# Patient Record
Sex: FEMALE | Race: WHITE | NOT HISPANIC OR LATINO | Employment: FULL TIME | URBAN - NONMETROPOLITAN AREA
[De-identification: names, ages, dates, MRNs, and addresses within clinical notes are randomized per-mention and may not be internally consistent; named-entity substitution may affect disease eponyms.]

---

## 2023-08-19 DIAGNOSIS — E78.2 MIXED HYPERLIPIDEMIA: ICD-10-CM

## 2023-08-19 DIAGNOSIS — I47.10 SUPRAVENTRICULAR TACHYCARDIA (CMS-HCC): ICD-10-CM

## 2023-08-19 DIAGNOSIS — I10 BENIGN HYPERTENSION: Primary | ICD-10-CM

## 2023-08-19 PROBLEM — R00.8 BIGEMINAL PULSE: Status: ACTIVE | Noted: 2023-08-19

## 2023-08-19 PROBLEM — E11.9 CONTROLLED TYPE 2 DIABETES MELLITUS WITHOUT COMPLICATION, WITHOUT LONG-TERM CURRENT USE OF INSULIN (MULTI): Status: ACTIVE | Noted: 2023-08-19

## 2023-08-19 PROBLEM — K76.0 NONALCOHOLIC FATTY LIVER: Status: ACTIVE | Noted: 2023-08-19

## 2023-08-19 PROBLEM — M77.10 LATERAL EPICONDYLITIS: Status: RESOLVED | Noted: 2023-08-19 | Resolved: 2023-08-19

## 2023-08-19 RX ORDER — METOPROLOL TARTRATE 50 MG/1
50 TABLET ORAL 2 TIMES DAILY
Qty: 180 TABLET | Refills: 0 | Status: SHIPPED | OUTPATIENT
Start: 2023-08-19 | End: 2023-11-10

## 2023-08-19 RX ORDER — METOPROLOL TARTRATE 50 MG/1
TABLET ORAL
COMMUNITY
Start: 2021-07-23 | End: 2023-08-19 | Stop reason: SDUPTHER

## 2023-08-19 RX ORDER — AMLODIPINE BESYLATE 10 MG/1
10 TABLET ORAL DAILY
Qty: 90 TABLET | Refills: 0 | Status: SHIPPED | OUTPATIENT
Start: 2023-08-19 | End: 2023-11-10

## 2023-08-19 RX ORDER — AMLODIPINE BESYLATE 10 MG/1
TABLET ORAL
COMMUNITY
Start: 2021-09-08 | End: 2023-08-19 | Stop reason: SDUPTHER

## 2023-08-19 RX ORDER — ATORVASTATIN CALCIUM 40 MG/1
40 TABLET, FILM COATED ORAL DAILY
Qty: 90 TABLET | Refills: 0 | Status: SHIPPED | OUTPATIENT
Start: 2023-08-19 | End: 2023-11-10

## 2023-08-19 RX ORDER — ATORVASTATIN CALCIUM 40 MG/1
1 TABLET, FILM COATED ORAL DAILY
COMMUNITY
Start: 2021-11-01 | End: 2023-08-19 | Stop reason: SDUPTHER

## 2023-08-19 RX ORDER — VALSARTAN AND HYDROCHLOROTHIAZIDE 160; 12.5 MG/1; MG/1
1 TABLET, FILM COATED ORAL EVERY 24 HOURS
Qty: 90 TABLET | Refills: 0 | Status: SHIPPED | OUTPATIENT
Start: 2023-08-19 | End: 2023-11-10

## 2023-08-19 RX ORDER — VALSARTAN AND HYDROCHLOROTHIAZIDE 160; 12.5 MG/1; MG/1
TABLET, FILM COATED ORAL EVERY 24 HOURS
COMMUNITY
Start: 2012-09-21 | End: 2023-08-19 | Stop reason: SDUPTHER

## 2023-11-10 DIAGNOSIS — E78.2 MIXED HYPERLIPIDEMIA: ICD-10-CM

## 2023-11-10 DIAGNOSIS — I10 BENIGN HYPERTENSION: ICD-10-CM

## 2023-11-10 DIAGNOSIS — I47.10 SUPRAVENTRICULAR TACHYCARDIA (CMS-HCC): ICD-10-CM

## 2023-11-10 RX ORDER — METOPROLOL TARTRATE 50 MG/1
50 TABLET ORAL 2 TIMES DAILY
Qty: 180 TABLET | Refills: 0 | Status: SHIPPED | OUTPATIENT
Start: 2023-11-10 | End: 2024-02-08

## 2023-11-10 RX ORDER — VALSARTAN AND HYDROCHLOROTHIAZIDE 160; 12.5 MG/1; MG/1
1 TABLET, FILM COATED ORAL DAILY
Qty: 90 TABLET | Refills: 0 | Status: SHIPPED | OUTPATIENT
Start: 2023-11-10 | End: 2024-02-08

## 2023-11-10 RX ORDER — AMLODIPINE BESYLATE 10 MG/1
10 TABLET ORAL DAILY
Qty: 90 TABLET | Refills: 0 | Status: SHIPPED | OUTPATIENT
Start: 2023-11-10 | End: 2024-02-08

## 2023-11-10 RX ORDER — ATORVASTATIN CALCIUM 40 MG/1
40 TABLET, FILM COATED ORAL DAILY
Qty: 90 TABLET | Refills: 0 | Status: SHIPPED | OUTPATIENT
Start: 2023-11-10 | End: 2024-02-08

## 2024-02-08 DIAGNOSIS — I47.10 SUPRAVENTRICULAR TACHYCARDIA (CMS-HCC): ICD-10-CM

## 2024-02-08 DIAGNOSIS — I10 BENIGN HYPERTENSION: ICD-10-CM

## 2024-02-08 DIAGNOSIS — E78.2 MIXED HYPERLIPIDEMIA: ICD-10-CM

## 2024-02-08 RX ORDER — VALSARTAN AND HYDROCHLOROTHIAZIDE 160; 12.5 MG/1; MG/1
1 TABLET, FILM COATED ORAL DAILY
Qty: 90 TABLET | Refills: 1 | Status: SHIPPED | OUTPATIENT
Start: 2024-02-08 | End: 2024-08-06

## 2024-02-08 RX ORDER — ATORVASTATIN CALCIUM 40 MG/1
40 TABLET, FILM COATED ORAL DAILY
Qty: 90 TABLET | Refills: 1 | Status: SHIPPED | OUTPATIENT
Start: 2024-02-08 | End: 2024-08-06

## 2024-02-08 RX ORDER — AMLODIPINE BESYLATE 10 MG/1
10 TABLET ORAL DAILY
Qty: 90 TABLET | Refills: 1 | Status: SHIPPED | OUTPATIENT
Start: 2024-02-08 | End: 2024-08-06

## 2024-02-08 RX ORDER — METOPROLOL TARTRATE 50 MG/1
50 TABLET ORAL 2 TIMES DAILY
Qty: 180 TABLET | Refills: 1 | Status: SHIPPED | OUTPATIENT
Start: 2024-02-08 | End: 2024-08-06

## 2024-02-29 ENCOUNTER — OFFICE VISIT (OUTPATIENT)
Dept: PRIMARY CARE | Facility: CLINIC | Age: 64
End: 2024-02-29
Payer: COMMERCIAL

## 2024-02-29 VITALS
HEART RATE: 61 BPM | DIASTOLIC BLOOD PRESSURE: 81 MMHG | WEIGHT: 254 LBS | BODY MASS INDEX: 45 KG/M2 | HEIGHT: 63 IN | OXYGEN SATURATION: 96 % | SYSTOLIC BLOOD PRESSURE: 129 MMHG

## 2024-02-29 DIAGNOSIS — E55.9 AVITAMINOSIS D: ICD-10-CM

## 2024-02-29 DIAGNOSIS — Z12.12 SCREENING FOR COLORECTAL CANCER: ICD-10-CM

## 2024-02-29 DIAGNOSIS — E66.01 CLASS 3 SEVERE OBESITY DUE TO EXCESS CALORIES WITH SERIOUS COMORBIDITY AND BODY MASS INDEX (BMI) OF 40.0 TO 44.9 IN ADULT (MULTI): ICD-10-CM

## 2024-02-29 DIAGNOSIS — I10 BENIGN HYPERTENSION: ICD-10-CM

## 2024-02-29 DIAGNOSIS — E78.2 MIXED HYPERLIPIDEMIA: ICD-10-CM

## 2024-02-29 DIAGNOSIS — E53.8 VITAMIN B12 DEFICIENCY: ICD-10-CM

## 2024-02-29 DIAGNOSIS — Z12.11 SCREENING FOR COLORECTAL CANCER: ICD-10-CM

## 2024-02-29 DIAGNOSIS — Z12.31 VISIT FOR SCREENING MAMMOGRAM: ICD-10-CM

## 2024-02-29 DIAGNOSIS — E11.9 CONTROLLED TYPE 2 DIABETES MELLITUS WITHOUT COMPLICATION, WITHOUT LONG-TERM CURRENT USE OF INSULIN (MULTI): Primary | ICD-10-CM

## 2024-02-29 PROBLEM — E66.813 CLASS 3 SEVERE OBESITY DUE TO EXCESS CALORIES WITH SERIOUS COMORBIDITY AND BODY MASS INDEX (BMI) OF 40.0 TO 44.9 IN ADULT: Status: ACTIVE | Noted: 2024-02-29

## 2024-02-29 LAB
ALBUMIN SERPL BCP-MCNC: 4.2 G/DL (ref 3.4–5)
ALP SERPL-CCNC: 71 U/L (ref 33–136)
ALT SERPL W P-5'-P-CCNC: 43 U/L (ref 7–45)
ANION GAP SERPL CALC-SCNC: 14 MMOL/L (ref 10–20)
AST SERPL W P-5'-P-CCNC: 39 U/L (ref 9–39)
BILIRUB SERPL-MCNC: 0.5 MG/DL (ref 0–1.2)
BUN SERPL-MCNC: 13 MG/DL (ref 6–23)
CALCIUM SERPL-MCNC: 9.2 MG/DL (ref 8.6–10.3)
CHLORIDE SERPL-SCNC: 102 MMOL/L (ref 98–107)
CHOLEST SERPL-MCNC: 173 MG/DL (ref 0–199)
CHOLESTEROL/HDL RATIO: 3.3
CO2 SERPL-SCNC: 28 MMOL/L (ref 21–32)
CREAT SERPL-MCNC: 0.83 MG/DL (ref 0.5–1.05)
EGFRCR SERPLBLD CKD-EPI 2021: 79 ML/MIN/1.73M*2
GLUCOSE SERPL-MCNC: 167 MG/DL (ref 74–99)
HDLC SERPL-MCNC: 52.9 MG/DL
LDLC SERPL CALC-MCNC: 82 MG/DL
NON HDL CHOLESTEROL: 120 MG/DL (ref 0–149)
POC HEMOGLOBIN A1C: 7.3 % (ref 4.2–6.5)
POTASSIUM SERPL-SCNC: 4.8 MMOL/L (ref 3.5–5.3)
PROT SERPL-MCNC: 7 G/DL (ref 6.4–8.2)
SODIUM SERPL-SCNC: 139 MMOL/L (ref 136–145)
TRIGL SERPL-MCNC: 191 MG/DL (ref 0–149)
VLDL: 38 MG/DL (ref 0–40)

## 2024-02-29 PROCEDURE — 83036 HEMOGLOBIN GLYCOSYLATED A1C: CPT | Performed by: FAMILY MEDICINE

## 2024-02-29 PROCEDURE — 4004F PT TOBACCO SCREEN RCVD TLK: CPT | Performed by: FAMILY MEDICINE

## 2024-02-29 PROCEDURE — 36415 COLL VENOUS BLD VENIPUNCTURE: CPT

## 2024-02-29 PROCEDURE — 99214 OFFICE O/P EST MOD 30 MIN: CPT | Performed by: FAMILY MEDICINE

## 2024-02-29 PROCEDURE — 3008F BODY MASS INDEX DOCD: CPT | Performed by: FAMILY MEDICINE

## 2024-02-29 PROCEDURE — 80053 COMPREHEN METABOLIC PANEL: CPT

## 2024-02-29 PROCEDURE — 82306 VITAMIN D 25 HYDROXY: CPT

## 2024-02-29 PROCEDURE — 3048F LDL-C <100 MG/DL: CPT | Performed by: FAMILY MEDICINE

## 2024-02-29 PROCEDURE — 3074F SYST BP LT 130 MM HG: CPT | Performed by: FAMILY MEDICINE

## 2024-02-29 PROCEDURE — 80061 LIPID PANEL: CPT

## 2024-02-29 PROCEDURE — 3079F DIAST BP 80-89 MM HG: CPT | Performed by: FAMILY MEDICINE

## 2024-02-29 RX ORDER — BLOOD SUGAR DIAGNOSTIC
STRIP MISCELLANEOUS
COMMUNITY
Start: 2021-11-08

## 2024-02-29 ASSESSMENT — PATIENT HEALTH QUESTIONNAIRE - PHQ9
SUM OF ALL RESPONSES TO PHQ9 QUESTIONS 1 AND 2: 0
2. FEELING DOWN, DEPRESSED OR HOPELESS: NOT AT ALL
1. LITTLE INTEREST OR PLEASURE IN DOING THINGS: NOT AT ALL

## 2024-02-29 NOTE — PROGRESS NOTES
Subjective   Patient ID: Helga Salmon is a 63 y.o. female who presents for Hypertension, Hyperlipidemia, and Diabetes.  HPI  No SE meds  No CP, SOB, numbness, weakness, dizziness, HA, vision changes  Occasional palpitations when in SVT     Ophtho-   Dentist- dentures  [place]-   Colonoscopy-   LILY-  FOBT-  UA/Micro-  Mammo- ordered  DEXA-  PAP-  Lung CT-  Coronary Calcium CT Score-  AAA-  EKG-  Prevnar- refused  Flu- 10/23  Shingrix-  Td-  Hep C-  Advance Directives-    Current Outpatient Medications:     amLODIPine (Norvasc) 10 mg tablet, Take 1 tablet (10 mg) by mouth once daily., Disp: 90 tablet, Rfl: 1    atorvastatin (Lipitor) 40 mg tablet, Take 1 tablet (40 mg) by mouth once daily., Disp: 90 tablet, Rfl: 1    blood sugar diagnostic (Accu-Chek Yaz Plus test strp) strip, Accu-Chek Yaz Plus In Vitro Strip  Quantity: 100  Refills: 0      Start : 2021  Active, Disp: , Rfl:     metoprolol tartrate (Lopressor) 50 mg tablet, Take 1 tablet by mouth 2 times a day., Disp: 180 tablet, Rfl: 1    valsartan-hydrochlorothiazide (Diovan-HCT) 160-12.5 mg tablet, Take 1 tablet by mouth once daily., Disp: 90 tablet, Rfl: 1   Past Surgical History:   Procedure Laterality Date     SECTION, CLASSIC  2013     Section    OTHER SURGICAL HISTORY  2013    Oral Surgery    OTHER SURGICAL HISTORY  2013    Ovarian Cystectomy    OTHER SURGICAL HISTORY  2013    Laparosc Restrictive Proc Adjustable Gastric Band Placement      Past Medical History:   Diagnosis Date    Abnormal finding on thyroid function test 2012    Lateral epicondylitis 2023    Morbid (severe) obesity due to excess calories (CMS/HCC) 2013    Morbid obesity    Personal history of other diseases of the circulatory system     History of hypertension    Personal history of other diseases of the digestive system     History of esophageal reflux    Personal history of other endocrine, nutritional and  "metabolic disease     History of hyperlipidemia     Social History     Tobacco Use    Smoking status: Every Day     Types: Cigarettes    Smokeless tobacco: Never      No family history on file.   Review of Systems    Objective   /81   Pulse 61   Ht 1.6 m (5' 3\")   Wt 115 kg (254 lb)   SpO2 96%   BMI 44.99 kg/m²    Physical Exam  Vitals and nursing note reviewed.   Constitutional:       General: She is not in acute distress.     Appearance: Normal appearance. She is obese. She is not ill-appearing.   HENT:      Head: Normocephalic and atraumatic.      Right Ear: Tympanic membrane, ear canal and external ear normal.      Left Ear: Tympanic membrane, ear canal and external ear normal.      Nose: Nose normal.      Mouth/Throat:      Mouth: Mucous membranes are moist.      Pharynx: Oropharynx is clear.   Eyes:      Extraocular Movements: Extraocular movements intact.      Conjunctiva/sclera: Conjunctivae normal.      Pupils: Pupils are equal, round, and reactive to light.   Neck:      Vascular: No carotid bruit.   Cardiovascular:      Rate and Rhythm: Normal rate and regular rhythm.      Pulses: Normal pulses.           Dorsalis pedis pulses are 2+ on the right side and 2+ on the left side.        Posterior tibial pulses are 2+ on the right side and 2+ on the left side.      Heart sounds: Normal heart sounds.   Pulmonary:      Effort: Pulmonary effort is normal.      Breath sounds: Normal breath sounds.   Abdominal:      General: Abdomen is flat. Bowel sounds are normal.      Palpations: Abdomen is soft.   Musculoskeletal:      Cervical back: Normal range of motion.      Right foot: No deformity.      Left foot: No deformity.   Feet:      Right foot:      Protective Sensation: 7 sites tested.  7 sites sensed.      Skin integrity: Callus present.      Toenail Condition: Right toenails are normal.      Left foot:      Protective Sensation: 7 sites tested.  7 sites sensed.      Skin integrity: No callus.      " Toenail Condition: Left toenails are normal.   Lymphadenopathy:      Cervical: No cervical adenopathy.   Skin:     Capillary Refill: Capillary refill takes less than 2 seconds.   Neurological:      General: No focal deficit present.      Mental Status: She is alert and oriented to person, place, and time.   Psychiatric:         Mood and Affect: Mood normal.         Behavior: Behavior normal.         Assessment/Plan   Problem List Items Addressed This Visit       Vitamin B12 deficiency    Relevant Orders    Vitamin B12    Mixed hyperlipidemia    Relevant Orders    Comprehensive Metabolic Panel (Completed)    Lipid Panel (Completed)    Controlled type 2 diabetes mellitus without complication, without long-term current use of insulin (CMS/[facility]) - Primary    Relevant Orders    POCT glycosylated hemoglobin (Hb A1C) manually resulted (Completed)    Benign hypertension    Relevant Orders    Comprehensive Metabolic Panel (Completed)    Avitaminosis D    Relevant Orders    Vitamin D 25-Hydroxy,Total (for eval of Vitamin D levels)     Other Visit Diagnoses       Visit for screening mammogram        Relevant Orders    BI mammo bilateral screening    Screening for colorectal cancer        Relevant Orders    Colonoscopy Screening; Average Risk Patient        Renewed/continued rest of medications  Checked labs  Updated Health Maintenance in HPI section  HTN, controlled- continue meds, low sodium diet     Obesity- caloric restriction, increase CV exercise     Hyperlipidemia- continue meds, low fat/cholesterol diet     SVT- metoprolol, avoid caffeine     DM, type 2- avoid sugars, low carbs, increase CV exercise, check feet daily    Low vitamin D/B12- supplement, follow level     F/U 6 months     Patient understands and agrees with treatment plan    Grover Gustafson, DO

## 2024-03-01 DIAGNOSIS — E11.9 CONTROLLED TYPE 2 DIABETES MELLITUS WITHOUT COMPLICATION, WITHOUT LONG-TERM CURRENT USE OF INSULIN (MULTI): ICD-10-CM

## 2024-03-01 DIAGNOSIS — E55.9 AVITAMINOSIS D: Primary | ICD-10-CM

## 2024-03-01 LAB — 25(OH)D3 SERPL-MCNC: 18 NG/ML (ref 30–100)

## 2024-03-01 RX ORDER — METFORMIN HYDROCHLORIDE 500 MG/1
1000 TABLET, EXTENDED RELEASE ORAL
Qty: 180 TABLET | Refills: 3 | Status: SHIPPED | OUTPATIENT
Start: 2024-03-01 | End: 2025-03-01

## 2024-03-01 RX ORDER — CHOLECALCIFEROL (VITAMIN D3) 1250 MCG
50000 TABLET ORAL
Qty: 12 TABLET | Refills: 3 | Status: SHIPPED | OUTPATIENT
Start: 2024-03-01 | End: 2025-03-01

## 2024-08-01 DIAGNOSIS — I10 BENIGN HYPERTENSION: ICD-10-CM

## 2024-08-01 DIAGNOSIS — I47.10 SUPRAVENTRICULAR TACHYCARDIA (CMS-HCC): ICD-10-CM

## 2024-08-01 DIAGNOSIS — E78.2 MIXED HYPERLIPIDEMIA: ICD-10-CM

## 2024-08-01 RX ORDER — AMLODIPINE BESYLATE 10 MG/1
10 TABLET ORAL DAILY
Qty: 90 TABLET | Refills: 3 | Status: SHIPPED | OUTPATIENT
Start: 2024-08-01

## 2024-08-01 RX ORDER — ATORVASTATIN CALCIUM 40 MG/1
40 TABLET, FILM COATED ORAL DAILY
Qty: 90 TABLET | Refills: 3 | Status: SHIPPED | OUTPATIENT
Start: 2024-08-01

## 2024-08-01 RX ORDER — METOPROLOL TARTRATE 50 MG/1
50 TABLET ORAL 2 TIMES DAILY
Qty: 180 TABLET | Refills: 3 | Status: SHIPPED | OUTPATIENT
Start: 2024-08-01

## 2024-08-01 RX ORDER — VALSARTAN AND HYDROCHLOROTHIAZIDE 160; 12.5 MG/1; MG/1
1 TABLET, FILM COATED ORAL DAILY
Qty: 90 TABLET | Refills: 3 | Status: SHIPPED | OUTPATIENT
Start: 2024-08-01

## 2024-09-13 ENCOUNTER — OFFICE VISIT (OUTPATIENT)
Dept: PRIMARY CARE | Facility: CLINIC | Age: 64
End: 2024-09-13
Payer: COMMERCIAL

## 2024-09-13 ENCOUNTER — HOSPITAL ENCOUNTER (OUTPATIENT)
Dept: RADIOLOGY | Facility: HOSPITAL | Age: 64
Discharge: HOME | End: 2024-09-13
Payer: COMMERCIAL

## 2024-09-13 VITALS — OXYGEN SATURATION: 93 % | SYSTOLIC BLOOD PRESSURE: 160 MMHG | HEART RATE: 77 BPM | DIASTOLIC BLOOD PRESSURE: 78 MMHG

## 2024-09-13 DIAGNOSIS — R10.9 FLANK PAIN: Primary | ICD-10-CM

## 2024-09-13 DIAGNOSIS — N12 PYELONEPHRITIS: Primary | ICD-10-CM

## 2024-09-13 DIAGNOSIS — R10.9 FLANK PAIN: ICD-10-CM

## 2024-09-13 LAB
POC APPEARANCE, URINE: CLEAR
POC BILIRUBIN, URINE: ABNORMAL
POC BLOOD, URINE: NEGATIVE
POC COLOR, URINE: YELLOW
POC GLUCOSE, URINE: NEGATIVE MG/DL
POC KETONES, URINE: NEGATIVE MG/DL
POC LEUKOCYTES, URINE: NEGATIVE
POC NITRITE,URINE: NEGATIVE
POC PH, URINE: 5.5 PH
POC PROTEIN, URINE: ABNORMAL MG/DL
POC SPECIFIC GRAVITY, URINE: >=1.03
POC UROBILINOGEN, URINE: 0.2 EU/DL

## 2024-09-13 PROCEDURE — 99213 OFFICE O/P EST LOW 20 MIN: CPT

## 2024-09-13 PROCEDURE — 4004F PT TOBACCO SCREEN RCVD TLK: CPT

## 2024-09-13 PROCEDURE — 3048F LDL-C <100 MG/DL: CPT

## 2024-09-13 PROCEDURE — 3077F SYST BP >= 140 MM HG: CPT

## 2024-09-13 PROCEDURE — 74176 CT ABD & PELVIS W/O CONTRAST: CPT

## 2024-09-13 PROCEDURE — 3078F DIAST BP <80 MM HG: CPT

## 2024-09-13 PROCEDURE — 81003 URINALYSIS AUTO W/O SCOPE: CPT

## 2024-09-13 RX ORDER — CIPROFLOXACIN 500 MG/1
500 TABLET ORAL 2 TIMES DAILY
Qty: 14 TABLET | Refills: 0 | Status: SHIPPED | OUTPATIENT
Start: 2024-09-13 | End: 2024-09-20

## 2024-09-13 RX ORDER — CIPROFLOXACIN 500 MG/1
500 TABLET ORAL EVERY 12 HOURS SCHEDULED
Status: DISCONTINUED | OUTPATIENT
Start: 2024-09-13 | End: 2024-09-14 | Stop reason: HOSPADM

## 2024-09-13 NOTE — PROGRESS NOTES
Subjective   Patient ID: Helga Salmon is a 64 y.o. female who presents for Abdominal Pain (Left side flank pain since 3:30 am, dry heaves).  HPI  - Woke up at 330 AM with L flank pain, nausea, felt like she had to go have a BM  - BM did not help  - Pain is achy all the time and occasionally sharp pain radiates around the front and back  - Nausea, dry heaving  - No fevers, chills, body aches  - often feels like she has to pee but nothing really comes out, but this has been a few months.   - Some urinary urgency, no frequency or burning. No hematuria.   - No recent injury or illness  - has tried tylenol at home for pain which does take edge off    Current Outpatient Medications:     amLODIPine (Norvasc) 10 mg tablet, TAKE 1 TABLET DAILY, Disp: 90 tablet, Rfl: 3    atorvastatin (Lipitor) 40 mg tablet, TAKE 1 TABLET DAILY, Disp: 90 tablet, Rfl: 3    blood sugar diagnostic (Accu-Chek Yaz Plus test strp) strip, Accu-Chek Yaz Plus In Vitro Strip  Quantity: 100  Refills: 0      Start : 2021  Active, Disp: , Rfl:     cholecalciferol (Vitamin D3) 1,250 mcg (50,000 unit) tablet, Take 1 tablet (50,000 Units) by mouth every 7 days., Disp: 12 tablet, Rfl: 3    metFORMIN XR (Glucophage-XR) 500 mg 24 hr tablet, Take 2 tablets (1,000 mg) by mouth once daily with breakfast. Do not crush, chew, or split., Disp: 180 tablet, Rfl: 3    metoprolol tartrate (Lopressor) 50 mg tablet, TAKE 1 TABLET TWICE A DAY, Disp: 180 tablet, Rfl: 3    valsartan-hydrochlorothiazide (Diovan-HCT) 160-12.5 mg tablet, TAKE 1 TABLET DAILY, Disp: 90 tablet, Rfl: 3   Past Surgical History:   Procedure Laterality Date     SECTION, CLASSIC  2013     Section    OTHER SURGICAL HISTORY  2013    Oral Surgery    OTHER SURGICAL HISTORY  2013    Ovarian Cystectomy    OTHER SURGICAL HISTORY  2013    Laparosc Restrictive Proc Adjustable Gastric Band Placement      Past Medical History:   Diagnosis Date    Abnormal finding  on thyroid function test 09/11/2012    Lateral epicondylitis 08/19/2023    Morbid (severe) obesity due to excess calories (Multi) 07/31/2013    Morbid obesity    Personal history of other diseases of the circulatory system     History of hypertension    Personal history of other diseases of the digestive system     History of esophageal reflux    Personal history of other endocrine, nutritional and metabolic disease     History of hyperlipidemia     Social History     Tobacco Use    Smoking status: Former     Types: Cigarettes    Smokeless tobacco: Never      No family history on file.   Review of Systems  10 point ROS negative except as otherwise noted in the HPI.      Objective   /78 (BP Location: Left arm, Patient Position: Sitting, BP Cuff Size: Large adult)   Pulse 77   SpO2 93%    Physical Exam  Vitals and nursing note reviewed.   Constitutional:       Appearance: Normal appearance.   Cardiovascular:      Rate and Rhythm: Normal rate and regular rhythm.      Pulses: Normal pulses.      Heart sounds: Normal heart sounds.   Pulmonary:      Effort: Pulmonary effort is normal.      Breath sounds: Normal breath sounds.   Abdominal:      General: Abdomen is flat. Bowel sounds are normal. There is no distension.      Palpations: Abdomen is soft.      Tenderness: There is no abdominal tenderness. There is left CVA tenderness. There is no right CVA tenderness, guarding or rebound.   Skin:     General: Skin is warm and dry.   Neurological:      General: No focal deficit present.      Mental Status: She is alert and oriented to person, place, and time.   Psychiatric:         Mood and Affect: Mood normal.         Behavior: Behavior normal.           Assessment/Plan   Problem List Items Addressed This Visit    None  Visit Diagnoses       Flank pain    -  Primary  - L sided flank pain since 330 AM. Dry heaving and nausea. No fevers/chills. No injury to area. + CVA tenderness on the L, otherwise exam intact   - UA  neg   - CT AP w/o contrast ordered STAT to assess for kidney stone .. Will f/u on results     Relevant Orders    POCT UA Automated manually resulted (Completed)    CT abdomen pelvis wo IV contrast            Discussed at visit any disease processes that were of concern as well as the risks, benefits and instructions on any new medication provided. Patient (and/or caretaker of patient if present) stated all questions were answered, and they voiced understanding of instructions.     Elvi Davis PA-C

## 2024-09-13 NOTE — PROGRESS NOTES
CT A/P returned suspicious for L pyelonephritis which correlates with clinical presentation.   Pt notified and cipro 500 mg BID for 7 days sent.

## 2025-02-13 DIAGNOSIS — E11.9 CONTROLLED TYPE 2 DIABETES MELLITUS WITHOUT COMPLICATION, WITHOUT LONG-TERM CURRENT USE OF INSULIN (MULTI): ICD-10-CM

## 2025-02-13 DIAGNOSIS — E55.9 AVITAMINOSIS D: ICD-10-CM

## 2025-02-18 RX ORDER — METFORMIN HYDROCHLORIDE 500 MG/1
TABLET, EXTENDED RELEASE ORAL
Qty: 180 TABLET | Refills: 0 | Status: SHIPPED | OUTPATIENT
Start: 2025-02-18

## 2025-02-18 RX ORDER — ASPIRIN 325 MG
50000 TABLET, DELAYED RELEASE (ENTERIC COATED) ORAL
Qty: 12 CAPSULE | Refills: 3 | Status: SHIPPED | OUTPATIENT
Start: 2025-02-18

## 2025-03-12 ENCOUNTER — APPOINTMENT (OUTPATIENT)
Dept: PRIMARY CARE | Facility: CLINIC | Age: 65
End: 2025-03-12
Payer: COMMERCIAL

## 2025-05-01 DIAGNOSIS — E11.9 CONTROLLED TYPE 2 DIABETES MELLITUS WITHOUT COMPLICATION, WITHOUT LONG-TERM CURRENT USE OF INSULIN: ICD-10-CM

## 2025-05-02 RX ORDER — METFORMIN HYDROCHLORIDE 500 MG/1
TABLET, EXTENDED RELEASE ORAL
Qty: 180 TABLET | Refills: 3 | Status: SHIPPED | OUTPATIENT
Start: 2025-05-02

## 2025-07-07 ENCOUNTER — CLINICAL SUPPORT (OUTPATIENT)
Dept: PRIMARY CARE | Facility: CLINIC | Age: 65
End: 2025-07-07
Payer: COMMERCIAL

## 2025-07-07 DIAGNOSIS — N12 PYELONEPHRITIS: ICD-10-CM

## 2025-07-07 DIAGNOSIS — R30.9 PAIN WITH URINATION: Primary | ICD-10-CM

## 2025-07-07 LAB
POC APPEARANCE, URINE: ABNORMAL
POC BILIRUBIN, URINE: NEGATIVE
POC BLOOD, URINE: ABNORMAL
POC COLOR, URINE: YELLOW
POC GLUCOSE, URINE: NEGATIVE MG/DL
POC KETONES, URINE: NEGATIVE MG/DL
POC LEUKOCYTES, URINE: ABNORMAL
POC NITRITE,URINE: POSITIVE
POC PH, URINE: 5.5 PH
POC PROTEIN, URINE: ABNORMAL MG/DL
POC SPECIFIC GRAVITY, URINE: 1.02
POC UROBILINOGEN, URINE: 0.2 EU/DL

## 2025-07-07 PROCEDURE — 81003 URINALYSIS AUTO W/O SCOPE: CPT | Performed by: FAMILY MEDICINE

## 2025-07-07 RX ORDER — CIPROFLOXACIN 500 MG/1
500 TABLET, FILM COATED ORAL 2 TIMES DAILY
Qty: 14 TABLET | Refills: 0 | Status: SHIPPED | OUTPATIENT
Start: 2025-07-07 | End: 2025-07-08 | Stop reason: HOSPADM

## 2025-07-08 ENCOUNTER — ANESTHESIA EVENT (OUTPATIENT)
Dept: OPERATING ROOM | Facility: HOSPITAL | Age: 65
End: 2025-07-08

## 2025-07-08 ENCOUNTER — APPOINTMENT (OUTPATIENT)
Dept: RADIOLOGY | Facility: HOSPITAL | Age: 65
End: 2025-07-08
Payer: COMMERCIAL

## 2025-07-08 ENCOUNTER — ANESTHESIA (OUTPATIENT)
Dept: OPERATING ROOM | Facility: HOSPITAL | Age: 65
End: 2025-07-08

## 2025-07-08 ENCOUNTER — HOSPITAL ENCOUNTER (OUTPATIENT)
Facility: HOSPITAL | Age: 65
Setting detail: OBSERVATION
Discharge: HOME | End: 2025-07-08
Attending: STUDENT IN AN ORGANIZED HEALTH CARE EDUCATION/TRAINING PROGRAM | Admitting: INTERNAL MEDICINE
Payer: COMMERCIAL

## 2025-07-08 ENCOUNTER — APPOINTMENT (OUTPATIENT)
Dept: CARDIOLOGY | Facility: HOSPITAL | Age: 65
End: 2025-07-08
Payer: COMMERCIAL

## 2025-07-08 VITALS
OXYGEN SATURATION: 97 % | RESPIRATION RATE: 18 BRPM | BODY MASS INDEX: 44.3 KG/M2 | SYSTOLIC BLOOD PRESSURE: 94 MMHG | HEIGHT: 63 IN | HEART RATE: 71 BPM | TEMPERATURE: 97 F | DIASTOLIC BLOOD PRESSURE: 51 MMHG | WEIGHT: 250 LBS

## 2025-07-08 DIAGNOSIS — N20.1 RIGHT URETERAL STONE: ICD-10-CM

## 2025-07-08 DIAGNOSIS — N12 PYELONEPHRITIS: ICD-10-CM

## 2025-07-08 DIAGNOSIS — N20.0 NEPHROLITHIASIS: Primary | ICD-10-CM

## 2025-07-08 LAB
ABO GROUP (TYPE) IN BLOOD: NORMAL
ALBUMIN SERPL BCP-MCNC: 4.4 G/DL (ref 3.4–5)
ALP SERPL-CCNC: 67 U/L (ref 33–136)
ALT SERPL W P-5'-P-CCNC: 33 U/L (ref 7–45)
ANION GAP SERPL CALC-SCNC: 15 MMOL/L (ref 10–20)
ANTIBODY SCREEN: NORMAL
APPEARANCE UR: ABNORMAL
AST SERPL W P-5'-P-CCNC: 28 U/L (ref 9–39)
BACTERIA #/AREA URNS AUTO: ABNORMAL /HPF
BASOPHILS # BLD AUTO: 0.01 X10*3/UL (ref 0–0.1)
BASOPHILS NFR BLD AUTO: 0.2 %
BILIRUB SERPL-MCNC: 0.6 MG/DL (ref 0–1.2)
BILIRUB UR STRIP.AUTO-MCNC: NEGATIVE MG/DL
BUN SERPL-MCNC: 24 MG/DL (ref 6–23)
CALCIUM SERPL-MCNC: 9.7 MG/DL (ref 8.6–10.3)
CHLORIDE SERPL-SCNC: 102 MMOL/L (ref 98–107)
CO2 SERPL-SCNC: 25 MMOL/L (ref 21–32)
COLOR UR: ABNORMAL
CREAT SERPL-MCNC: 1.13 MG/DL (ref 0.5–1.05)
EGFRCR SERPLBLD CKD-EPI 2021: 54 ML/MIN/1.73M*2
EOSINOPHIL # BLD AUTO: 0.1 X10*3/UL (ref 0–0.7)
EOSINOPHIL NFR BLD AUTO: 1.5 %
ERYTHROCYTE [DISTWIDTH] IN BLOOD BY AUTOMATED COUNT: 16.6 % (ref 11.5–14.5)
GLUCOSE BLD MANUAL STRIP-MCNC: 196 MG/DL (ref 74–99)
GLUCOSE BLD MANUAL STRIP-MCNC: 252 MG/DL (ref 74–99)
GLUCOSE SERPL-MCNC: 252 MG/DL (ref 74–99)
GLUCOSE UR STRIP.AUTO-MCNC: NORMAL MG/DL
HCT VFR BLD AUTO: 40.4 % (ref 36–46)
HGB BLD-MCNC: 12.9 G/DL (ref 12–16)
HOLD SPECIMEN: NORMAL
IMM GRANULOCYTES # BLD AUTO: 0.03 X10*3/UL (ref 0–0.7)
IMM GRANULOCYTES NFR BLD AUTO: 0.5 % (ref 0–0.9)
INR PPP: 1.2 (ref 0.9–1.1)
KETONES UR STRIP.AUTO-MCNC: NEGATIVE MG/DL
LEUKOCYTE ESTERASE UR QL STRIP.AUTO: ABNORMAL
LYMPHOCYTES # BLD AUTO: 0.75 X10*3/UL (ref 1.2–4.8)
LYMPHOCYTES NFR BLD AUTO: 11.4 %
MCH RBC QN AUTO: 29.7 PG (ref 26–34)
MCHC RBC AUTO-ENTMCNC: 31.9 G/DL (ref 32–36)
MCV RBC AUTO: 93 FL (ref 80–100)
MONOCYTES # BLD AUTO: 0.41 X10*3/UL (ref 0.1–1)
MONOCYTES NFR BLD AUTO: 6.2 %
MUCOUS THREADS #/AREA URNS AUTO: ABNORMAL /LPF
NEUTROPHILS # BLD AUTO: 5.3 X10*3/UL (ref 1.2–7.7)
NEUTROPHILS NFR BLD AUTO: 80.2 %
NITRITE UR QL STRIP.AUTO: NEGATIVE
NRBC BLD-RTO: 0.3 /100 WBCS (ref 0–0)
PH UR STRIP.AUTO: 5.5 [PH]
PLATELET # BLD AUTO: 468 X10*3/UL (ref 150–450)
POTASSIUM SERPL-SCNC: 3.9 MMOL/L (ref 3.5–5.3)
PROT SERPL-MCNC: 7.2 G/DL (ref 6.4–8.2)
PROT UR STRIP.AUTO-MCNC: ABNORMAL MG/DL
PROTHROMBIN TIME: 12.8 SECONDS (ref 9.8–12.4)
RBC # BLD AUTO: 4.34 X10*6/UL (ref 4–5.2)
RBC # UR STRIP.AUTO: ABNORMAL MG/DL
RBC #/AREA URNS AUTO: >20 /HPF
RH FACTOR (ANTIGEN D): NORMAL
SODIUM SERPL-SCNC: 138 MMOL/L (ref 136–145)
SP GR UR STRIP.AUTO: 1.02
SQUAMOUS #/AREA URNS AUTO: ABNORMAL /HPF
UROBILINOGEN UR STRIP.AUTO-MCNC: NORMAL MG/DL
WBC # BLD AUTO: 6.6 X10*3/UL (ref 4.4–11.3)
WBC #/AREA URNS AUTO: >50 /HPF
WBC CLUMPS #/AREA URNS AUTO: ABNORMAL /HPF

## 2025-07-08 PROCEDURE — 2550000001 HC RX 255 CONTRASTS: Performed by: STUDENT IN AN ORGANIZED HEALTH CARE EDUCATION/TRAINING PROGRAM

## 2025-07-08 PROCEDURE — 96376 TX/PRO/DX INJ SAME DRUG ADON: CPT | Mod: 59

## 2025-07-08 PROCEDURE — 82947 ASSAY GLUCOSE BLOOD QUANT: CPT | Mod: 59

## 2025-07-08 PROCEDURE — 2500000005 HC RX 250 GENERAL PHARMACY W/O HCPCS: Performed by: INTERNAL MEDICINE

## 2025-07-08 PROCEDURE — 2500000004 HC RX 250 GENERAL PHARMACY W/ HCPCS (ALT 636 FOR OP/ED): Performed by: STUDENT IN AN ORGANIZED HEALTH CARE EDUCATION/TRAINING PROGRAM

## 2025-07-08 PROCEDURE — 96365 THER/PROPH/DIAG IV INF INIT: CPT | Mod: 59

## 2025-07-08 PROCEDURE — 99239 HOSP IP/OBS DSCHRG MGMT >30: CPT | Performed by: INTERNAL MEDICINE

## 2025-07-08 PROCEDURE — 81001 URINALYSIS AUTO W/SCOPE: CPT | Performed by: STUDENT IN AN ORGANIZED HEALTH CARE EDUCATION/TRAINING PROGRAM

## 2025-07-08 PROCEDURE — 87186 SC STD MICRODIL/AGAR DIL: CPT | Mod: GEALAB | Performed by: STUDENT IN AN ORGANIZED HEALTH CARE EDUCATION/TRAINING PROGRAM

## 2025-07-08 PROCEDURE — 2500000004 HC RX 250 GENERAL PHARMACY W/ HCPCS (ALT 636 FOR OP/ED): Performed by: NURSE PRACTITIONER

## 2025-07-08 PROCEDURE — 2500000002 HC RX 250 W HCPCS SELF ADMINISTERED DRUGS (ALT 637 FOR MEDICARE OP, ALT 636 FOR OP/ED): Performed by: NURSE PRACTITIONER

## 2025-07-08 PROCEDURE — 85610 PROTHROMBIN TIME: CPT | Performed by: NURSE PRACTITIONER

## 2025-07-08 PROCEDURE — 99285 EMERGENCY DEPT VISIT HI MDM: CPT | Mod: 25 | Performed by: STUDENT IN AN ORGANIZED HEALTH CARE EDUCATION/TRAINING PROGRAM

## 2025-07-08 PROCEDURE — 86900 BLOOD TYPING SEROLOGIC ABO: CPT | Performed by: NURSE PRACTITIONER

## 2025-07-08 PROCEDURE — 74177 CT ABD & PELVIS W/CONTRAST: CPT

## 2025-07-08 PROCEDURE — 2500000002 HC RX 250 W HCPCS SELF ADMINISTERED DRUGS (ALT 637 FOR MEDICARE OP, ALT 636 FOR OP/ED): Performed by: STUDENT IN AN ORGANIZED HEALTH CARE EDUCATION/TRAINING PROGRAM

## 2025-07-08 PROCEDURE — 99223 1ST HOSP IP/OBS HIGH 75: CPT | Performed by: UROLOGY

## 2025-07-08 PROCEDURE — G0378 HOSPITAL OBSERVATION PER HR: HCPCS

## 2025-07-08 PROCEDURE — 36415 COLL VENOUS BLD VENIPUNCTURE: CPT | Performed by: STUDENT IN AN ORGANIZED HEALTH CARE EDUCATION/TRAINING PROGRAM

## 2025-07-08 PROCEDURE — 96366 THER/PROPH/DIAG IV INF ADDON: CPT | Mod: 59

## 2025-07-08 PROCEDURE — 96372 THER/PROPH/DIAG INJ SC/IM: CPT | Performed by: NURSE PRACTITIONER

## 2025-07-08 PROCEDURE — 2500000004 HC RX 250 GENERAL PHARMACY W/ HCPCS (ALT 636 FOR OP/ED): Mod: JZ

## 2025-07-08 PROCEDURE — 96375 TX/PRO/DX INJ NEW DRUG ADDON: CPT | Mod: 59

## 2025-07-08 PROCEDURE — 80053 COMPREHEN METABOLIC PANEL: CPT | Performed by: STUDENT IN AN ORGANIZED HEALTH CARE EDUCATION/TRAINING PROGRAM

## 2025-07-08 PROCEDURE — 74177 CT ABD & PELVIS W/CONTRAST: CPT | Performed by: RADIOLOGY

## 2025-07-08 PROCEDURE — 2500000001 HC RX 250 WO HCPCS SELF ADMINISTERED DRUGS (ALT 637 FOR MEDICARE OP): Performed by: PHYSICIAN ASSISTANT

## 2025-07-08 PROCEDURE — 82947 ASSAY GLUCOSE BLOOD QUANT: CPT

## 2025-07-08 PROCEDURE — 85025 COMPLETE CBC W/AUTO DIFF WBC: CPT | Performed by: STUDENT IN AN ORGANIZED HEALTH CARE EDUCATION/TRAINING PROGRAM

## 2025-07-08 PROCEDURE — 87086 URINE CULTURE/COLONY COUNT: CPT | Mod: GEALAB | Performed by: STUDENT IN AN ORGANIZED HEALTH CARE EDUCATION/TRAINING PROGRAM

## 2025-07-08 PROCEDURE — 99223 1ST HOSP IP/OBS HIGH 75: CPT | Performed by: PHYSICIAN ASSISTANT

## 2025-07-08 PROCEDURE — 2500000001 HC RX 250 WO HCPCS SELF ADMINISTERED DRUGS (ALT 637 FOR MEDICARE OP): Performed by: NURSE PRACTITIONER

## 2025-07-08 PROCEDURE — 36415 COLL VENOUS BLD VENIPUNCTURE: CPT | Performed by: NURSE PRACTITIONER

## 2025-07-08 PROCEDURE — 93005 ELECTROCARDIOGRAM TRACING: CPT

## 2025-07-08 PROCEDURE — 96361 HYDRATE IV INFUSION ADD-ON: CPT | Mod: 59

## 2025-07-08 RX ORDER — TAMSULOSIN HYDROCHLORIDE 0.4 MG/1
0.4 CAPSULE ORAL DAILY
Status: DISCONTINUED | OUTPATIENT
Start: 2025-07-08 | End: 2025-07-08 | Stop reason: HOSPADM

## 2025-07-08 RX ORDER — SODIUM CHLORIDE 9 MG/ML
100 INJECTION, SOLUTION INTRAVENOUS CONTINUOUS
Status: DISCONTINUED | OUTPATIENT
Start: 2025-07-08 | End: 2025-07-08 | Stop reason: HOSPADM

## 2025-07-08 RX ORDER — METOPROLOL TARTRATE 50 MG/1
50 TABLET ORAL 2 TIMES DAILY
Status: DISCONTINUED | OUTPATIENT
Start: 2025-07-08 | End: 2025-07-08 | Stop reason: HOSPADM

## 2025-07-08 RX ORDER — KETOROLAC TROMETHAMINE 15 MG/ML
15 INJECTION, SOLUTION INTRAMUSCULAR; INTRAVENOUS ONCE
Status: COMPLETED | OUTPATIENT
Start: 2025-07-08 | End: 2025-07-08

## 2025-07-08 RX ORDER — OXYCODONE HYDROCHLORIDE 5 MG/1
5 TABLET ORAL EVERY 6 HOURS PRN
Qty: 15 TABLET | Refills: 0 | Status: SHIPPED | OUTPATIENT
Start: 2025-07-08 | End: 2025-07-13

## 2025-07-08 RX ORDER — ONDANSETRON HYDROCHLORIDE 2 MG/ML
4 INJECTION, SOLUTION INTRAVENOUS ONCE
Status: COMPLETED | OUTPATIENT
Start: 2025-07-08 | End: 2025-07-08

## 2025-07-08 RX ORDER — ACETAMINOPHEN 325 MG/1
975 TABLET ORAL EVERY 8 HOURS
Status: DISCONTINUED | OUTPATIENT
Start: 2025-07-08 | End: 2025-07-08 | Stop reason: HOSPADM

## 2025-07-08 RX ORDER — TAMSULOSIN HYDROCHLORIDE 0.4 MG/1
0.4 CAPSULE ORAL ONCE
Status: COMPLETED | OUTPATIENT
Start: 2025-07-08 | End: 2025-07-08

## 2025-07-08 RX ORDER — CEFTRIAXONE 1 G/50ML
1 INJECTION, SOLUTION INTRAVENOUS EVERY 24 HOURS
Status: DISCONTINUED | OUTPATIENT
Start: 2025-07-08 | End: 2025-07-08 | Stop reason: HOSPADM

## 2025-07-08 RX ORDER — INSULIN LISPRO 100 [IU]/ML
0-10 INJECTION, SOLUTION INTRAVENOUS; SUBCUTANEOUS EVERY 6 HOURS
Status: DISCONTINUED | OUTPATIENT
Start: 2025-07-08 | End: 2025-07-08 | Stop reason: HOSPADM

## 2025-07-08 RX ORDER — TAMSULOSIN HYDROCHLORIDE 0.4 MG/1
0.4 CAPSULE ORAL DAILY
Qty: 7 CAPSULE | Refills: 0 | Status: SHIPPED | OUTPATIENT
Start: 2025-07-08 | End: 2025-07-15

## 2025-07-08 RX ORDER — ENOXAPARIN SODIUM 100 MG/ML
40 INJECTION SUBCUTANEOUS EVERY 12 HOURS SCHEDULED
Status: DISCONTINUED | OUTPATIENT
Start: 2025-07-08 | End: 2025-07-08 | Stop reason: HOSPADM

## 2025-07-08 RX ORDER — ATORVASTATIN CALCIUM 40 MG/1
40 TABLET, FILM COATED ORAL DAILY
Status: CANCELLED | OUTPATIENT
Start: 2025-07-08

## 2025-07-08 RX ORDER — OXYCODONE HYDROCHLORIDE 5 MG/1
5 TABLET ORAL EVERY 4 HOURS PRN
Status: DISCONTINUED | OUTPATIENT
Start: 2025-07-08 | End: 2025-07-08 | Stop reason: HOSPADM

## 2025-07-08 RX ORDER — HYDROMORPHONE HYDROCHLORIDE 1 MG/ML
INJECTION, SOLUTION INTRAMUSCULAR; INTRAVENOUS; SUBCUTANEOUS
Status: COMPLETED
Start: 2025-07-08 | End: 2025-07-08

## 2025-07-08 RX ORDER — DEXTROSE 50 % IN WATER (D50W) INTRAVENOUS SYRINGE
25
Status: DISCONTINUED | OUTPATIENT
Start: 2025-07-08 | End: 2025-07-08 | Stop reason: HOSPADM

## 2025-07-08 RX ORDER — HYDROMORPHONE HYDROCHLORIDE 1 MG/ML
1 INJECTION, SOLUTION INTRAMUSCULAR; INTRAVENOUS; SUBCUTANEOUS ONCE
Status: COMPLETED | OUTPATIENT
Start: 2025-07-08 | End: 2025-07-08

## 2025-07-08 RX ORDER — NALOXONE HYDROCHLORIDE 0.4 MG/ML
0.2 INJECTION, SOLUTION INTRAMUSCULAR; INTRAVENOUS; SUBCUTANEOUS EVERY 5 MIN PRN
Status: DISCONTINUED | OUTPATIENT
Start: 2025-07-08 | End: 2025-07-08 | Stop reason: HOSPADM

## 2025-07-08 RX ORDER — AMLODIPINE BESYLATE 10 MG/1
10 TABLET ORAL DAILY
Status: DISCONTINUED | OUTPATIENT
Start: 2025-07-08 | End: 2025-07-08 | Stop reason: HOSPADM

## 2025-07-08 RX ORDER — CEFTRIAXONE 1 G/50ML
1 INJECTION, SOLUTION INTRAVENOUS ONCE
Status: COMPLETED | OUTPATIENT
Start: 2025-07-08 | End: 2025-07-08

## 2025-07-08 RX ORDER — INSULIN LISPRO 100 [IU]/ML
0-10 INJECTION, SOLUTION INTRAVENOUS; SUBCUTANEOUS
Status: DISCONTINUED | OUTPATIENT
Start: 2025-07-08 | End: 2025-07-08

## 2025-07-08 RX ORDER — DEXTROSE 50 % IN WATER (D50W) INTRAVENOUS SYRINGE
12.5
Status: DISCONTINUED | OUTPATIENT
Start: 2025-07-08 | End: 2025-07-08 | Stop reason: HOSPADM

## 2025-07-08 RX ORDER — CIPROFLOXACIN 500 MG/1
500 TABLET, FILM COATED ORAL 2 TIMES DAILY
Qty: 14 TABLET | Refills: 0 | Status: SHIPPED | OUTPATIENT
Start: 2025-07-08 | End: 2025-07-15

## 2025-07-08 RX ORDER — DOCUSATE SODIUM 100 MG/1
100 CAPSULE, LIQUID FILLED ORAL 2 TIMES DAILY
Status: DISCONTINUED | OUTPATIENT
Start: 2025-07-08 | End: 2025-07-08 | Stop reason: HOSPADM

## 2025-07-08 RX ADMIN — IOHEXOL 75 ML: 350 INJECTION, SOLUTION INTRAVENOUS at 03:22

## 2025-07-08 RX ADMIN — SODIUM CHLORIDE 100 ML/HR: 0.9 INJECTION, SOLUTION INTRAVENOUS at 07:49

## 2025-07-08 RX ADMIN — SODIUM CHLORIDE, SODIUM LACTATE, POTASSIUM CHLORIDE, AND CALCIUM CHLORIDE 1000 ML: .6; .31; .03; .02 INJECTION, SOLUTION INTRAVENOUS at 03:30

## 2025-07-08 RX ADMIN — CEFTRIAXONE 1 G: 1 INJECTION, SOLUTION INTRAVENOUS at 13:45

## 2025-07-08 RX ADMIN — AMLODIPINE BESYLATE 10 MG: 10 TABLET ORAL at 08:49

## 2025-07-08 RX ADMIN — DOCUSATE SODIUM 100 MG: 100 CAPSULE, LIQUID FILLED ORAL at 08:49

## 2025-07-08 RX ADMIN — ENOXAPARIN SODIUM 40 MG: 100 INJECTION SUBCUTANEOUS at 08:49

## 2025-07-08 RX ADMIN — HYDROMORPHONE HYDROCHLORIDE 1 MG: 1 INJECTION, SOLUTION INTRAMUSCULAR; INTRAVENOUS; SUBCUTANEOUS at 04:53

## 2025-07-08 RX ADMIN — METOPROLOL TARTRATE 50 MG: 50 TABLET, FILM COATED ORAL at 08:49

## 2025-07-08 RX ADMIN — ONDANSETRON 4 MG: 2 INJECTION, SOLUTION INTRAMUSCULAR; INTRAVENOUS at 04:39

## 2025-07-08 RX ADMIN — ONDANSETRON 4 MG: 2 INJECTION, SOLUTION INTRAMUSCULAR; INTRAVENOUS at 02:42

## 2025-07-08 RX ADMIN — Medication 4 L/MIN: at 09:13

## 2025-07-08 RX ADMIN — CEFTRIAXONE 1 G: 1 INJECTION, SOLUTION INTRAVENOUS at 02:43

## 2025-07-08 RX ADMIN — KETOROLAC TROMETHAMINE 15 MG: 15 INJECTION, SOLUTION INTRAMUSCULAR; INTRAVENOUS at 02:42

## 2025-07-08 RX ADMIN — INSULIN LISPRO 2 UNITS: 100 INJECTION, SOLUTION INTRAVENOUS; SUBCUTANEOUS at 09:13

## 2025-07-08 RX ADMIN — HYDROMORPHONE HYDROCHLORIDE 1 MG: 1 INJECTION, SOLUTION INTRAMUSCULAR; INTRAVENOUS; SUBCUTANEOUS at 04:39

## 2025-07-08 RX ADMIN — TAMSULOSIN HYDROCHLORIDE 0.4 MG: 0.4 CAPSULE ORAL at 08:49

## 2025-07-08 RX ADMIN — Medication 4 L/MIN: at 05:30

## 2025-07-08 RX ADMIN — ACETAMINOPHEN 975 MG: 325 TABLET, FILM COATED ORAL at 08:48

## 2025-07-08 SDOH — SOCIAL STABILITY: SOCIAL INSECURITY: HAVE YOU HAD THOUGHTS OF HARMING ANYONE ELSE?: YES

## 2025-07-08 SDOH — SOCIAL STABILITY: SOCIAL INSECURITY: HAVE YOU HAD ANY THOUGHTS OF HARMING ANYONE ELSE?: NO

## 2025-07-08 SDOH — ECONOMIC STABILITY: TRANSPORTATION INSECURITY: IN THE PAST 12 MONTHS, HAS LACK OF TRANSPORTATION KEPT YOU FROM MEDICAL APPOINTMENTS OR FROM GETTING MEDICATIONS?: NO

## 2025-07-08 SDOH — SOCIAL STABILITY: SOCIAL INSECURITY: WITHIN THE LAST YEAR, HAVE YOU BEEN HUMILIATED OR EMOTIONALLY ABUSED IN OTHER WAYS BY YOUR PARTNER OR EX-PARTNER?: NO

## 2025-07-08 SDOH — SOCIAL STABILITY: SOCIAL INSECURITY: ABUSE: ADULT

## 2025-07-08 SDOH — SOCIAL STABILITY: SOCIAL INSECURITY: WERE YOU ABLE TO COMPLETE ALL THE BEHAVIORAL HEALTH SCREENINGS?: YES

## 2025-07-08 SDOH — SOCIAL STABILITY: SOCIAL INSECURITY: HAS ANYONE EVER THREATENED TO HURT YOUR FAMILY OR YOUR PETS?: NO

## 2025-07-08 SDOH — ECONOMIC STABILITY: HOUSING INSECURITY: IN THE LAST 12 MONTHS, WAS THERE A TIME WHEN YOU WERE NOT ABLE TO PAY THE MORTGAGE OR RENT ON TIME?: NO

## 2025-07-08 SDOH — ECONOMIC STABILITY: FOOD INSECURITY: WITHIN THE PAST 12 MONTHS, THE FOOD YOU BOUGHT JUST DIDN'T LAST AND YOU DIDN'T HAVE MONEY TO GET MORE.: NEVER TRUE

## 2025-07-08 SDOH — ECONOMIC STABILITY: FOOD INSECURITY: WITHIN THE PAST 12 MONTHS, YOU WORRIED THAT YOUR FOOD WOULD RUN OUT BEFORE YOU GOT THE MONEY TO BUY MORE.: NEVER TRUE

## 2025-07-08 SDOH — SOCIAL STABILITY: SOCIAL INSECURITY: ARE THERE ANY APPARENT SIGNS OF INJURIES/BEHAVIORS THAT COULD BE RELATED TO ABUSE/NEGLECT?: NO

## 2025-07-08 SDOH — SOCIAL STABILITY: SOCIAL INSECURITY
WITHIN THE LAST YEAR, HAVE YOU BEEN KICKED, HIT, SLAPPED, OR OTHERWISE PHYSICALLY HURT BY YOUR PARTNER OR EX-PARTNER?: NO

## 2025-07-08 SDOH — ECONOMIC STABILITY: HOUSING INSECURITY: AT ANY TIME IN THE PAST 12 MONTHS, WERE YOU HOMELESS OR LIVING IN A SHELTER (INCLUDING NOW)?: NO

## 2025-07-08 SDOH — SOCIAL STABILITY: SOCIAL INSECURITY
WITHIN THE LAST YEAR, HAVE YOU BEEN RAPED OR FORCED TO HAVE ANY KIND OF SEXUAL ACTIVITY BY YOUR PARTNER OR EX-PARTNER?: NO

## 2025-07-08 SDOH — SOCIAL STABILITY: SOCIAL INSECURITY: WITHIN THE LAST YEAR, HAVE YOU BEEN AFRAID OF YOUR PARTNER OR EX-PARTNER?: NO

## 2025-07-08 SDOH — ECONOMIC STABILITY: INCOME INSECURITY: IN THE PAST 12 MONTHS HAS THE ELECTRIC, GAS, OIL, OR WATER COMPANY THREATENED TO SHUT OFF SERVICES IN YOUR HOME?: NO

## 2025-07-08 SDOH — ECONOMIC STABILITY: HOUSING INSECURITY: IN THE PAST 12 MONTHS, HOW MANY TIMES HAVE YOU MOVED WHERE YOU WERE LIVING?: 0

## 2025-07-08 SDOH — ECONOMIC STABILITY: FOOD INSECURITY: HOW HARD IS IT FOR YOU TO PAY FOR THE VERY BASICS LIKE FOOD, HOUSING, MEDICAL CARE, AND HEATING?: NOT HARD AT ALL

## 2025-07-08 SDOH — SOCIAL STABILITY: SOCIAL INSECURITY: DOES ANYONE TRY TO KEEP YOU FROM HAVING/CONTACTING OTHER FRIENDS OR DOING THINGS OUTSIDE YOUR HOME?: NO

## 2025-07-08 SDOH — SOCIAL STABILITY: SOCIAL INSECURITY: DO YOU FEEL UNSAFE GOING BACK TO THE PLACE WHERE YOU ARE LIVING?: NO

## 2025-07-08 SDOH — SOCIAL STABILITY: SOCIAL INSECURITY: ARE YOU OR HAVE YOU BEEN THREATENED OR ABUSED PHYSICALLY, EMOTIONALLY, OR SEXUALLY BY ANYONE?: NO

## 2025-07-08 SDOH — SOCIAL STABILITY: SOCIAL INSECURITY: DO YOU FEEL ANYONE HAS EXPLOITED OR TAKEN ADVANTAGE OF YOU FINANCIALLY OR OF YOUR PERSONAL PROPERTY?: NO

## 2025-07-08 ASSESSMENT — LIFESTYLE VARIABLES
AUDIT-C TOTAL SCORE: 1
HAVE YOU EVER FELT YOU SHOULD CUT DOWN ON YOUR DRINKING: NO
PRESCIPTION_ABUSE_PAST_12_MONTHS: NO
HOW OFTEN DO YOU HAVE A DRINK CONTAINING ALCOHOL: MONTHLY OR LESS
HAVE PEOPLE ANNOYED YOU BY CRITICIZING YOUR DRINKING: NO
SKIP TO QUESTIONS 9-10: 1
EVER FELT BAD OR GUILTY ABOUT YOUR DRINKING: NO
AUDIT-C TOTAL SCORE: 1
HOW MANY STANDARD DRINKS CONTAINING ALCOHOL DO YOU HAVE ON A TYPICAL DAY: 1 OR 2
TOTAL SCORE: 0
HOW OFTEN DO YOU HAVE 6 OR MORE DRINKS ON ONE OCCASION: NEVER
SUBSTANCE_ABUSE_PAST_12_MONTHS: NO
EVER HAD A DRINK FIRST THING IN THE MORNING TO STEADY YOUR NERVES TO GET RID OF A HANGOVER: NO

## 2025-07-08 ASSESSMENT — PAIN SCALES - GENERAL
PAINLEVEL_OUTOF10: 10 - WORST POSSIBLE PAIN
PAINLEVEL_OUTOF10: 10 - WORST POSSIBLE PAIN
PAINLEVEL_OUTOF10: 0 - NO PAIN
PAINLEVEL_OUTOF10: 10 - WORST POSSIBLE PAIN
PAINLEVEL_OUTOF10: 0 - NO PAIN

## 2025-07-08 ASSESSMENT — COGNITIVE AND FUNCTIONAL STATUS - GENERAL
DAILY ACTIVITIY SCORE: 24
PATIENT BASELINE BEDBOUND: NO
DAILY ACTIVITIY SCORE: 24
MOBILITY SCORE: 24
MOBILITY SCORE: 24

## 2025-07-08 ASSESSMENT — ACTIVITIES OF DAILY LIVING (ADL)
HEARING - RIGHT EAR: HEARING AID
PATIENT'S MEMORY ADEQUATE TO SAFELY COMPLETE DAILY ACTIVITIES?: YES
LACK_OF_TRANSPORTATION: NO
ADEQUATE_TO_COMPLETE_ADL: YES
HEARING - LEFT EAR: HEARING AID
WALKS IN HOME: INDEPENDENT
JUDGMENT_ADEQUATE_SAFELY_COMPLETE_DAILY_ACTIVITIES: YES
BATHING: INDEPENDENT
FEEDING YOURSELF: INDEPENDENT
TOILETING: INDEPENDENT
LACK_OF_TRANSPORTATION: NO
GROOMING: INDEPENDENT
DRESSING YOURSELF: INDEPENDENT

## 2025-07-08 ASSESSMENT — PAIN - FUNCTIONAL ASSESSMENT
PAIN_FUNCTIONAL_ASSESSMENT: 0-10

## 2025-07-08 ASSESSMENT — PAIN DESCRIPTION - LOCATION
LOCATION: BACK
LOCATION: ABDOMEN
LOCATION: BACK
LOCATION: BACK

## 2025-07-08 ASSESSMENT — PAIN DESCRIPTION - ORIENTATION
ORIENTATION: RIGHT

## 2025-07-08 ASSESSMENT — PAIN DESCRIPTION - PAIN TYPE: TYPE: ACUTE PAIN

## 2025-07-08 ASSESSMENT — PATIENT HEALTH QUESTIONNAIRE - PHQ9
1. LITTLE INTEREST OR PLEASURE IN DOING THINGS: NOT AT ALL
SUM OF ALL RESPONSES TO PHQ9 QUESTIONS 1 & 2: 0
2. FEELING DOWN, DEPRESSED OR HOPELESS: NOT AT ALL

## 2025-07-08 NOTE — ED PROVIDER NOTES
CC: Flank Pain (Pt c/o rt flank/abd pain with nausea. )     HPI:  Patient is a 64-year-old female who presents to the emergency department with right flank pain.  She felt like she was having a urinary tract infection yesterday because she was having dysuria and urinary frequency.  She reached out to her primary care doctor and left a urine sample which was concerning for UTI.  She was told that she would be sent in Cipro but when she reached out to the pharmacy they had not received the antibiotic.  Patient states tonight around 11 PM she started having right flank and abdominal pain with associated nausea.  States the pain goes from an 8-9/10 and then will intensify to a 10 out of 10.  Denies history of nephrolithiasis.  Former tobacco use disorder but quit a few years ago.  No fever.    Records Reviewed:  Recent available ED and inpatient notes reviewed in EMR.    PMHx/PSHx:  Per HPI.   - has a past medical history of Abnormal finding on thyroid function test, Lateral epicondylitis, Morbid (severe) obesity due to excess calories (Multi), Personal history of other diseases of the circulatory system, Personal history of other diseases of the digestive system, and Personal history of other endocrine, nutritional and metabolic disease.  - has a past surgical history that includes  section, classic (2013); Other surgical history (2013); Other surgical history (2013); and Other surgical history (2013).  - has Avitaminosis D; Benign hypertension; Benign paroxysmal positional vertigo; Bigeminal pulse; Controlled type 2 diabetes mellitus without complication, without long-term current use of insulin; Mixed hyperlipidemia; Nonalcoholic fatty liver; Pain in joint involving ankle and foot; Status following surgery for weight loss; Supraventricular tachycardia; Vitamin B12 deficiency; and Class 3 severe obesity due to excess calories with serious comorbidity and body mass index (BMI) of 40.0 to  44.9 in adult on their problem list.    Medications:  Reviewed in EMR. See EMR for complete list of medications and doses.    Allergies:  Patient has no known allergies.    Social History:  - Tobacco:  reports that she has quit smoking. Her smoking use included cigarettes. She has never used smokeless tobacco.   - Alcohol:  reports no history of alcohol use.   - Illicit Drugs:  reports no history of drug use.     ROS:  Per HPI.       ???????????????????????????????????????????????????????????????  Triage Vitals:  T 36 °C (96.8 °F)  HR 81  BP (!) 191/78  RR 18  O2 (!) 93 % None (Room air)    Physical Exam  ???????????????????????????????????????????????????????????????  GEN: Uncomfortable appearing  HEAD: atraumatic  EYES: no scleral icterus  ENT: mmm  CVS/CHEST: reg rate, nl rhythm  PULM: CTA b/l no wheezes, crackles, or rhonchi   GI: soft, negative Wild's, no significant tenderness to palpation.  The majority of her tenderness is around her right lateral abdomen  BACK: no CVA tenderness  NEURO: Awake and alert, Strength and sensation is equal in b/l upper and lower extremities, normal ambulation  SKIN: warm, dry    Assessment and Plan:  Patient is a 64-year-old-year-old female presents to the emergency department with right flank pain.  Has a urinary tract infection.  May be pyelonephritis.  But is also markedly hypertensive with a history of smoking therefore we will obtain CT to rule out other etiologies of flank pain including aortic pathologies, nephrolithiasis, cholecystitis.  Disposition pending imaging and workup at this time.  Covered with Rocephin.  Given a liter of IV fluids.  Given Toradol and Zofran for symptomatic management. See ED course.    ED Course:  ED Course as of 07/08/25 0617   Tue Jul 08, 2025 0227 WBC, Urine(!): >50 [HD]   0254 GLUCOSE(!): 252 [HD]   0424 IMPRESSION:  2 mm nonobstructing calculus at the right ureterovesical junction  with mild right hydronephrosis.      Perinephric  fluid/stranding is nonspecific, however, please correlate  clinically to exclude superimposed infection.      1.2 cm indeterminate low-density lesion in the mid left kidney.  Please refer to follow-up recommendations below.      Stable hepatosplenomegaly and suggestion of hepatic steatosis.              MACRO:  Incidental Finding:  Renal lesion showing homogenous attenuation and  thin wall with no septation/calcification/mural nodules. Given the  internal higher than fluid attenuation, findings are indeterminate  for malignancy. (**-YCF-**)   [HD]   0430 Workup shows a 2 mm UVJ stone with pyelonephritis and a mild BRYANT.  Patient still in pain.  She has rigors on reevaluation.  Given the concern that this may develop into a septic stone discussed admission for which patient was amendable.  Will be admitted for IV antibiotics pain control and urology on consultation in case patient needs intervention.   [HD]      ED Course User Index  [HD] Leslie Calix DO         Diagnoses as of 07/08/25 0617   Nephrolithiasis   Pyelonephritis       Disposition:  Admitted     Leslie Calix DO      Procedures ? Smart[employer] last updated 7/8/2025 2:44 AM        Leslie Calix DO  07/08/25 0618

## 2025-07-08 NOTE — CONSULTS
"Reason For Consult  Right ureteral stone    History Of Present Illness  Helga Salmon is a 64 y.o. female presenting with right-sided pain.  CT scan identified a 2 mm stone at the right ureterovesical junction.  She received Dilaudid this morning and is currently unable to provide a history.  She is seen with her .  She was having irritative voiding symptoms prior to presentation additionally.  Her creatinine is elevated at 1.13 from prior of 0.83 a year ago.  Alysis identified greater than 50 WBCs and greater than 20 RBCs with 1+ bacteria.  Urine cultures pending.     Past Medical History  She has a past medical history of Abnormal finding on thyroid function test (2012), Lateral epicondylitis (2023), Morbid (severe) obesity due to excess calories (Multi) (2013), Personal history of other diseases of the circulatory system, Personal history of other diseases of the digestive system, and Personal history of other endocrine, nutritional and metabolic disease.    Surgical History  She has a past surgical history that includes  section, classic (2013); Other surgical history (2013); Other surgical history (2013); and Other surgical history (2013).     Social History  She reports that she has quit smoking. Her smoking use included cigarettes. She has never used smokeless tobacco. She reports that she does not drink alcohol and does not use drugs.    Family History  Family History[1]     Allergies  Patient has no known allergies.    Review of Systems  Right sided abdominal pain, urgency     Physical Exam  No distress  Normal respiratory effort  Regular rate and rhythm abdomen soft  Nontender nondistended     Last Recorded Vitals  Blood pressure (!) 151/95, pulse 90, temperature 36.4 °C (97.5 °F), temperature source Temporal, resp. rate 19, height 1.6 m (5' 3\"), weight 113 kg (250 lb), SpO2 95%.    Relevant Results      CT scan viewed, 2 mm stone at the right " ureterovesical junction.  Creatinine 1.13, elevated from prior of 0.83  Glucose 252  WBC 6.6  Urinalysis with greater than 50 WBCs and greater than 20 RBCs, 1+     Assessment/Plan     64-year-old has a 2 mm stone in the distal right ureter.  She presented with right-sided flank pain.  Her stone was complicated by an elevated creatinine of 1.13.    She also appears to have a urinary tract infection.  She has been started on Rocephin.  Urine culture is pending.  She has been started on tamsulosin to facilitate stone passage.  Strain urine.  She has been added on today for treatment of the stone, pending improvement of her symptoms and passage of the stone.    12:50 Addendum: Patient denies any further pain since 4 AM this morning.  We reviewed options and conservative management is planned.  If she continues to remain stable, she be discharged on antibiotics, tamsulosin and pain medications, with follow-up in the office in 1 week.          Dmitry Fontaine MD         [1] No family history on file.

## 2025-07-08 NOTE — DISCHARGE SUMMARY
Discharge Diagnosis  Right UVJ kidney stone  Right mild hydronephrosis  Right pyelonephritis/Complicated UTI  ELIEL    Issues Requiring Follow-Up  Stay hydrated after discharge.  Continue to take antibiotic as instructed.   Followup with both PCP as well as the referral to urology for further management of kidney stone.   Recommend getting her sleep apnea addressed due to concern for nocturnal hypoxia. May need repeat sleep study which can be arranged outpatient if needed.     Discharge Meds     Medication List      START taking these medications     oxyCODONE 5 mg immediate release tablet; Commonly known as: Roxicodone;   Take 1 tablet (5 mg) by mouth every 6 hours if needed for severe pain (7 -   10) for up to 5 days.   tamsulosin 0.4 mg 24 hr capsule; Commonly known as: Flomax; Take 1   capsule (0.4 mg) by mouth once daily for 7 days. Do not crush, chew, or   split.     CONTINUE taking these medications     amLODIPine 10 mg tablet; Commonly known as: Norvasc; TAKE 1 TABLET DAILY   atorvastatin 40 mg tablet; Commonly known as: Lipitor; TAKE 1 TABLET   DAILY   cholecalciferol 1.25 mg (50,000 units) capsule; Commonly known as:   Vitamin D-3; TAKE 1 CAPSULE EVERY 7 DAYS   ciprofloxacin 500 mg tablet; Commonly known as: Cipro; Take 1 tablet   (500 mg) by mouth 2 times a day for 7 days.   metFORMIN  mg 24 hr tablet; Commonly known as: Glucophage-XR; TAKE   2 TABLETS ONCE DAILY WITH BREAKFAST. DO NOT CRUSH, CHEW OR SPLIT   metoprolol tartrate 50 mg tablet; Commonly known as: Lopressor; TAKE 1   TABLET TWICE A DAY   valsartan-hydrochlorothiazide 160-12.5 mg tablet; Commonly known as:   Diovan-HCT; TAKE 1 TABLET DAILY     ASK your doctor about these medications     Accu-Chek Yaz Plus test strp; Generic drug: blood sugar diagnostic       Test Results Pending At Discharge  Pending Labs       Order Current Status    Urine Culture In process            Hospital Course  This is a 65yo female with medical history  "significant for Hypertension and Type 2 DM presenting to [facility] with complaint of UTI not treated yet and abdominal pain. She was found to have right UVJ kidney stone, Right mild hydronephrosis, Right pyelonephritis after evaluation in ED and admitted to hospitalist service.    Patient's pain improved during initial treatment essentially resolved. Was on fluids, pain medication and tamsulosin. Urology evaluated patient and given symptoms resolved cleared her for discharge after advancing diet which she tolerated with very minimal abdominal discomfort (which urology was aware of and cleared for discharge). Instructions provided to her to stay hydrated, complete antibiotic course and take medication as instruction. Referral was placed for urology outpatient. Recommend her to see her PCP within a week for post-hospitalization check. She can make her appointment at her convenience.    Discussed plan with patient and she was agreeable with it. Pt discharged home today (7/8/25).    Time spent with hospital discharge planning approximately 55 minutes.     Pertinent Physical Exam At Time of Discharge  Physical Exam  Constitutional: Pleasant, Awake/Alert/Oriented to person place and time. No distress  Neck: No JVD  Cardiovascular: Regular rate and rhythm, S1, S2. No extra heart sounds or murmurs  Respiratory: Clear to auscultation bilaterally. No wheezing, rales or rhonchi. Good chest wall expansion  Abdomen: Soft, Minimal right sided abdominal discomfort \"not pain\". Obese. Bowel sounds appreciated  Extremities: Warm and dry. No acute rashes and lesions  Psychiatric: Appropriate mood and affect      Outpatient Follow-Up  Future Appointments   Date Time Provider Department Center   8/5/2025 11:30 AM MD ANDREW [id number]         Rell Pritchett DO  "

## 2025-07-08 NOTE — SIGNIFICANT EVENT
Patient was seen by nocturnist service and assigned to myself this morning.   I saw her this morning. Still was having the right sided pain. Says she was supposed to get an antibiotic from her PCP but she thinks there was an issue with the script and pharmacy so never started to take it and pain got worse so she came to hospital. Feels better now but still has some right sided pain. Also tells me she has an ELIEL diagnosis but doesn't use O2 or machine at home.     Updated Assessment/Plan:  # Right UVJ kidney stone  # Right mild hydronephrosis  # Right pyelonephritis  - Urology consult. Appreciate recommendations when made. Dr Fontaine to see in room this morning (discussed with him) to see if procedure needed  - Continue maintenance fluids, Flomax and pain control  - Continue Rocephin to treat infection. Urine culture pending.   - NPO until known if surgery or not planned today.    # Obstructive sleep apnea / # Nocturnal hypoxia - Pt reports having ELIEL diagnosis not having a home machine or O2 at home. Says she was placed on O2 by nursing staff overnight. She can use O2 at night. Take off during day. Outpatient sleep study evaluation repeat if not done recently and can work on machine vs nasal cannula O2 after discharge.     Chronic Major Comorbidities:    # Hypertension: Amlodipine, Metoprolol tartrate  # Diabetes Mellitus Type 2: Hold Metformin here. Can cover with sliding scale    DVT prophylaxis: Lovenox  Code Status: Full COde    Disposition: Urology to re-see patient to see if procedure warranted. Would monitor today regardless to ensure symptoms improved. If doing well tomorrow and cleared by urology can consider discharge with outpatient followup.     Care coordinator updated on plan.

## 2025-07-08 NOTE — PROGRESS NOTES
07/08/25 1057   Discharge Planning   Living Arrangements Spouse/significant other   Support Systems Spouse/significant other   Assistance Needed A&0x4, independent with ADLs, no DME, drives, room air, no cpap or bipap, not active with any HHC, PCP is Grover Gustafson   Type of Residence Private residence   Number of Stairs to Enter Residence 3   Number of Stairs Within Residence 0   Do you have animals or pets at home? Yes   Type of Animals or Pets 1 dog   Who is requesting discharge planning? Provider   Home or Post Acute Services None   Expected Discharge Disposition Home  (home with spouse, denies any HHC needs)   Does the patient need discharge transport arranged? No   Financial Resource Strain   How hard is it for you to pay for the very basics like food, housing, medical care, and heating? Not hard   Housing Stability   In the last 12 months, was there a time when you were not able to pay the mortgage or rent on time? N   In the past 12 months, how many times have you moved where you were living? 0   At any time in the past 12 months, were you homeless or living in a shelter (including now)? N   Transportation Needs   In the past 12 months, has lack of transportation kept you from medical appointments or from getting medications? no   In the past 12 months, has lack of transportation kept you from meetings, work, or from getting things needed for daily living? No   Stroke Family Assessment   Stroke Family Assessment Needed No   Intensity of Service   Intensity of Service 0-30 min

## 2025-07-08 NOTE — H&P
History Of Present Illness  Helga Salmon is a 64 y.o. female with past medical history of type II non-insulin-dependent diabetes, stage III morbid obesity status post gastric banding with nonalcoholic fatty liver, hepatomegaly, splenomegaly, none insulin-dependent diabetes, hypertension who presented last evening to the ED due to complaints of severe right lower quadrant abdominal pain.  The patient has a 5-day history of worsening right lower quadrant pain and hematuria.  She took a urine sample to her PCPs office and was told that a prescription was phoned into the pharmacy.  The patient and her  state the prescription was not available when they went to pick it up.  She had been able to tolerate the pain until around 2100 on 7/07/2025 when the pain became much more severe and the patient's  called a rescue squad.      In the ED labs were notable for glucose of 252, BUN 24, creatinine 1.13 with an EGFR of 54.  A UA was positive for 50+ protein 2+ blood 500 leukoesterase greater than 50 white blood cells per high-power field and 10-25 epithelial cells.  The urine was sent for a culture.  A CBC showed a normal white count of 6.6 with no left shift 468 platelets.    CT CT of the abdomen and pelvis with and without out IV contrast showed stable hepatomegaly and 1.2 cm left adrenal nodule a 2 mm calculus in the right ureterovesical junction with mild right hydronephrosis.  There was  a 1.2 cm indeterminate density in the left renal region.  There was a 2 mm nonobstructing left renal calculus.  There was no aortic aneurysm.    The patient was given IV Dilaudid due to severe pain.  She did have 1 emesis while in the ED.  She was started on IV ceftriaxone and is admitted.  She is receiving IV hydration and a urology consult has been completed.  Pt denies previous h/o renal stones.    Past Medical History  She has a past medical history of Abnormal finding on thyroid function test (09/11/2012), Lateral  epicondylitis (2023), Morbid (severe) obesity due to excess calories (Multi) (2013), Personal history of other diseases of the circulatory system, Personal history of other diseases of the digestive system, and Personal history of other endocrine, nutritional and metabolic disease.    Surgical History  She has a past surgical history that includes  section, classic (2013); Other surgical history (2013); Other surgical history (2013); and Other surgical history (2013).     Social History  She reports that she has quit smoking. Her smoking use included cigarettes. She has never used smokeless tobacco. She reports that she does not drink alcohol and does not use drugs.    Family History  Family History[1]     Allergies  Patient has no known allergies.    Review of Systems  ROS  Review of Systems Complete ROS could not be obtained d/t pt's sleepiness after receiving Dilaudid.    Constitutional:  Positive for chills, denies fever  HENT:  Negative for sore throat and trouble swallowing.    Eyes:  Negative for visual disturbance.   Respiratory: Negative for cough, shortness of breath and wheezing.    Per spouse pt snores loudly, never had sleep study.    Cardiovascular:  Negative for chest pain, palpitations, orthopnea and leg swelling.   Gastrointestinal:  Positive  for abdominal pain RLQ, Negative for blood in stool,   Endocrine: Unable to obtain.  Genitourinary:  Positive for dysuria,   Musculoskeletal:  Unable to obtain  Skin:  Unable to obtain.  Neurological:  Unable to obtain  Psychiatric/Behavioral:  Unable to obtain.    Physical Exam     Last Recorded Vitals  /71   Pulse 85   Temp 36 °C (96.8 °F) (Temporal)   Resp 18   Wt 113 kg (250 lb)   SpO2 (!) 93%     PE:  Constitutional:      Sick but not toxic looking female, sleepy but arousable.  HENT:      Head: atraumatic.      Mouth/Throat: Full upper dentures.  Moderately dry mucous membranes.  No oral lesions  noted.     Pharynx: Oropharynx is clear.  Eyes:      Pupils equal round reactive to light accommodation.  EOMs intact without nystagmus.     Conjunctiva/sclera: Conjunctivae normal.   Neck: No JVD, adenopathy, thyromegaly or palpable masses.  Cardiovascular:      Regular rate rhythm.  Positive tachycardic.  Positive systolic murmur heard best at base.  Pulmonary:      Effort: No increased work of respiration.  Positive mild tachypnea.  No use of accessory muscles for respiration.     Breath sounds: Clear to auscultation.  No adventitious breath sounds.  Chest:      Chest wall: No tenderness.   Abdominal:      General: Bowel sounds are normal. There is no distension. +obese.  Mild tenderness to palpation RLQ (after pt medicated)    There is no guarding or rebound.   Extremities: No pitting edema lower extremities.  Palpable pedal pulses.  Musculoskeletal:         General: No swollen, red, hot joints.  Skin:     Findings: No rash to visible skin..   Neurological:      Mental Status: She is sleepy and has difficulty staying awake but is arousable (patient had just been medicated for pain (     Cranial Nerves: No cranial nerve deficit.      Sensory: No obvious sensory deficit.     Motor: Patient moves all 4 extremities.     Gait: Gait not examined.  Psychiatric:         Mood and Affect: Unable to determine.     Behavior: Behavior cooperative.  Relevant Results  Results for orders placed or performed during the hospital encounter of 07/08/25 (from the past 24 hours)   Urinalysis with Reflex Culture and Microscopic   Result Value Ref Range    Color, Urine Light-Orange (N) Light-Yellow, Yellow, Dark-Yellow    Appearance, Urine Ex.Turbid (N) Clear    Specific Gravity, Urine 1.019 1.005 - 1.035    pH, Urine 5.5 5.0, 5.5, 6.0, 6.5, 7.0, 7.5, 8.0    Protein, Urine 50 (1+) (A) NEGATIVE, 10 (TRACE), 20 (TRACE) mg/dL    Glucose, Urine Normal Normal mg/dL    Blood, Urine 0.2 (2+) (A) NEGATIVE mg/dL    Ketones, Urine NEGATIVE  NEGATIVE mg/dL    Bilirubin, Urine NEGATIVE NEGATIVE mg/dL    Urobilinogen, Urine Normal Normal mg/dL    Nitrite, Urine NEGATIVE NEGATIVE    Leukocyte Esterase, Urine 500 [place]/uL (A) NEGATIVE   Microscopic Only, Urine   Result Value Ref Range    WBC, Urine >50 (A) 1-5, NONE /HPF    WBC Clumps, Urine FEW Reference range not established. /HPF    RBC, Urine >20 (A) NONE, 1-2, 3-5 /HPF    Squamous Epithelial Cells, Urine 10-25 (FEW) Reference range not established. /HPF    Bacteria, Urine 1+ (A) NONE SEEN /HPF    Mucus, Urine FEW Reference range not established. /LPF   CBC and Auto Differential   Result Value Ref Range    WBC 6.6 4.4 - 11.3 x10*3/uL    nRBC 0.3 (H) 0.0 - 0.0 /100 WBCs    RBC 4.34 4.00 - 5.20 x10*6/uL    Hemoglobin 12.9 12.0 - 16.0 g/dL    Hematocrit 40.4 36.0 - 46.0 %    MCV 93 80 - 100 fL    MCH 29.7 26.0 - 34.0 pg    MCHC 31.9 (L) 32.0 - 36.0 g/dL    RDW 16.6 (H) 11.5 - 14.5 %    Platelets 468 (H) 150 - 450 x10*3/uL    Neutrophils % 80.2 40.0 - 80.0 %    Immature Granulocytes %, Automated 0.5 0.0 - 0.9 %    Lymphocytes % 11.4 13.0 - 44.0 %    Monocytes % 6.2 2.0 - 10.0 %    Eosinophils % 1.5 0.0 - 6.0 %    Basophils % 0.2 0.0 - 2.0 %    Neutrophils Absolute 5.30 1.20 - 7.70 x10*3/uL    Immature Granulocytes Absolute, Automated 0.03 0.00 - 0.70 x10*3/uL    Lymphocytes Absolute 0.75 (L) 1.20 - 4.80 x10*3/uL    Monocytes Absolute 0.41 0.10 - 1.00 x10*3/uL    Eosinophils Absolute 0.10 0.00 - 0.70 x10*3/uL    Basophils Absolute 0.01 0.00 - 0.10 x10*3/uL   Comprehensive metabolic panel   Result Value Ref Range    Glucose 252 (H) 74 - 99 mg/dL    Sodium 138 136 - 145 mmol/L    Potassium 3.9 3.5 - 5.3 mmol/L    Chloride 102 98 - 107 mmol/L    Bicarbonate 25 21 - 32 mmol/L    Anion Gap 15 10 - 20 mmol/L    Urea Nitrogen 24 (H) 6 - 23 mg/dL    Creatinine 1.13 (H) 0.50 - 1.05 mg/dL    eGFR 54 (L) >60 mL/min/1.73m*2    Calcium 9.7 8.6 - 10.3 mg/dL    Albumin 4.4 3.4 - 5.0 g/dL    Alkaline Phosphatase 67 33 - 136  U/L    Total Protein 7.2 6.4 - 8.2 g/dL    AST 28 9 - 39 U/L    Bilirubin, Total 0.6 0.0 - 1.2 mg/dL    ALT 33 7 - 45 U/L    CT abdomen pelvis w IV contrast  Result Date: 7/8/2025  Interpreted By:  Francesca Balbuena, STUDY: CT ABDOMEN PELVIS W IV CONTRAST;  7/8/2025 3:21 am   INDICATION: Signs/Symptoms:R flank pain.   COMPARISON: 09/13/2024   ACCESSION NUMBER(S): [id number]   ORDERING CLINICIAN: ASHISH REID   TECHNIQUE: Axial CT images of the abdomen and pelvis with coronal and sagittal reconstructed images obtained after intravenous administration of contrast   FINDINGS: LOWER CHEST: No acute abnormality of the lung bases. Moderate coronary artery calcifications. BONES: No acute osseous abnormality. Mild diffuse degenerative disc changes. Lower lumbar facet arthropathy noted. ABDOMINAL WALL: Within normal limits.   ABDOMEN:   LIVER: Stable hepatomegaly, 24 cm in craniocaudad length. Nodular contour of the liver suggesting cirrhosis. BILE DUCTS: No biliary dilatation. GALLBLADDER: No calcified gallstones. No pericholecystic inflammatory changes. PANCREAS: Within normal limits. SPLEEN: Splenomegaly, similar to prior, 16 cm in craniocaudad length. No parenchymal abnormality. ADRENALS: Stable 1.2 cm left adrenal nodule. The right adrenal gland is unremarkable. KIDNEYS and URETERS: A 2 mm calculus at the right ureterovesical junction with mild right hydronephrosis. Right perinephric fluid/fat stranding is nonspecific. 1.2 cm indeterminate density left renal lesion. 2 mm nonobstructing left renal calculus.     VESSELS: No aortic aneurysm. Moderate aortic calcification. RETROPERITONEUM: No pathologically enlarged retroperitoneal lymph nodes.   PELVIS:   REPRODUCTIVE ORGANS: No pelvic masses. BLADDER: Within normal limits.   BOWEL: Gastric lap band noted. No dilated bowel. Colonic diverticulosis without acute diverticulitis. Normal appendix. PERITONEUM: No ascites or free air, no fluid collection.       2 mm  nonobstructing calculus at the right ureterovesical junction with mild right hydronephrosis.   Perinephric fluid/stranding is nonspecific, however, please correlate clinically to exclude superimposed infection.   1.2 cm indeterminate low-density lesion in the mid left kidney. Please refer to follow-up recommendations below.   Stable hepatosplenomegaly and suggestion of hepatic steatosis.       MACRO: Incidental Finding:  Renal lesion showing homogenous attenuation and thin wall with no septation/calcification/mural nodules. Given the internal higher than fluid attenuation, findings are indeterminate for malignancy. (**-YCF-**)   Instructions:  Further evaluation with outpatient MRI of kidneys without and with contrast versus without and with contrast outpatient CT scan is recommended. (Porfirio BR, Yumiko SG, Curtis NM, et al. Management of the Incidental Renal Mass on CT: A White Paper of the ACR Incidental Findings Committee. J Am Rupert Radiol. 2018;15(2):264-273.) [email or web address]   Signed by: Francesca Balbuena 7/8/2025 3:53 AM Dictation workstation:   [id number]  Scheduled medications  Scheduled Medications[2]  Continuous medications  Continuous Medications[3]  PRN medications  PRN Medications[4]        Assessment/Plan   Assessment & Plan    Right ureteral stone with mild hydronephrosis  Urine culture sent.  Urology consult requested.  NPO until seen by urology  IV fluids  IV or po analgesia/Narcan prn  Tamsulosin  Rocephin    Complicated UTI  Pt started on Rocephin  Pt has right ureteral stone with mild hydronephrosis  Pt has NIDDM  +U/A, C&S pending  IV fluids    Hypertension:     Pt's BP elevated partly d/t pain.  Resume some antihypertensive meds with parameters.    Monitor BP    Type ll NIDDM:  Hold Metformin  Sliding scale coverage at meals with Lispro  Hypoglycemic protocol    1.2 cm indeterminate lesion mid left kidney  Per radiology, lesion indeterminate for malignancy  F/U MRI/CT with and  without contrast recommended    Code status d/w pt's spouse as pt too sleepy.  Full CODE with intubation    60 minutes was spent on the care of this patient including record review, examination of pt and creation of medical document.    Veronica Vidal PA-C         [1] No family history on file.  [2] insulin lispro, 0-10 Units, subcutaneous, q6h  tamsulosin, 0.4 mg, oral, Once  [3]    [4] PRN medications: dextrose, dextrose, glucagon, glucagon, naloxone

## 2025-07-09 ENCOUNTER — PATIENT OUTREACH (OUTPATIENT)
Dept: PRIMARY CARE | Facility: CLINIC | Age: 65
End: 2025-07-09
Payer: COMMERCIAL

## 2025-07-09 ENCOUNTER — TELEPHONE (OUTPATIENT)
Dept: INTERNAL MEDICINE | Facility: HOSPITAL | Age: 65
End: 2025-07-09
Payer: COMMERCIAL

## 2025-07-09 NOTE — PROGRESS NOTES
Called patient at 8:50 pm for page regarding ongoing abdominal pain after discharge from [facility] after being briefly admitted for renal colic in setting of 2 mm nonobstructing right UVJ stone and right mild hydronephrosis.    Patient was found to have a UTI and was started on antibiotics and continues on ciprofloxacin.  Was also discharged with Flomax and instructions to drink plenty of water.  She was provided oxycodone for pain.  Pain has continued after arrival home and she also reports brief episode of chills with nausea but remains afebrile and denies vomiting.  States she would not like to return to the ED at this time.    Recommended taking Tylenol every 6-8 hours around-the-clock for baseline pain control and using oxycodone if Tylenol was not sufficient to control pain.  Mild BRYANT on admitting labs so did not recommend NSAIDs at this time.  Also recommended heat/ice as needed for comfort.    Instructed patient to present to ED if she has any changes in mentation, documented fevers, worsening chills/nausea/vomiting with p.o. intolerance or pain not responding to oral pain medications.  Patient expressed understanding.

## 2025-07-09 NOTE — TELEPHONE ENCOUNTER
Patient discharged from hospital yesterday.    Patient called today to checkup on her following discharge from hospital.  Her  answered phone and she was with him.  She is doing well at home. They have their prescriptions and taking them as directed. No issues since home.  Aware to stay hydrated and nourished.  Updated on the prelim urine culture results. Anticipate that the Cipro will work but will track the culture to finalization. Will call pt/spouse up if a change in antibiotic needs to be made otherwise if susceptible to Cipro, no need to call. He is aware of this.  Aware to followup with outpatient providers.  No questions to answer from their perspective.  Wished them both well and to take care.     Rell Pritchett DO  Hospitalist, [facility]

## 2025-07-09 NOTE — PROGRESS NOTES
TCM completed 07/09/25   Discharge Facility: [facility]  Discharge Diagnosis: Right UVJ kidney stone, Right mild hydronephrosis; Right pyelonephritis/Complicated UTI  Admission Date: 7/8/25  Discharge Date: 7/8/25  Discharge Disposition: Home     PCP Appointment Date: Patient declined      (Needs seen by: 7/21)  Specialist Appointment Date:  Urology- 8/5/25      Hospital Encounter and Summary Linked: Yes  ED to Hosp-Admission (Discharged) with Rell Pritchett DO; Leslie Calix DO; Santiago Liz MD (07/08/2025)                        Issues Requiring Follow-Up:  Stay hydrated after discharge.  Continue to take antibiotic as instructed.   Followup with both PCP as well as the referral to urology for further management of kidney stone.   Recommend getting her sleep apnea addressed due to concern for nocturnal hypoxia. May need repeat sleep study which can be arranged outpatient if needed.                 --See discharge assessment below for further details--      Wrap Up  Wrap Up Additional Comments: TCM initial outreach post discharge completed successfully. Spoke with the patient's spouse Migue who states the patient is doing better today - last night was rough, but patients pain is now better controlled. Migue confirmed they received the patient's discharge summary and have all medications needed in the home for the patient. TCM phone number was provided to the patient/caregiver, with the patient/caregiver encouraged to call back with any non-emergent questions or concerns. Migue verbalized his understanding and states he has no questions or concerns at this time, but will call back if needed. Migue declined to schedule a PCP follow up stating they plan to see the urologist, but will call to schedule an appt if needed. (7/9/2025 11:51 AM)  Call End Time: 1147 (7/9/2025 11:51 AM)    Engagement  Call Start Time: 1146 (7/9/2025 11:51 AM)    Medications  Medications reviewed with patient/caregiver?: Yes (Changes  only. Spoke with the patients spouse Migue) (7/9/2025 11:51 AM)  Is the patient having any side effects they believe may be caused by any medication additions or changes?: No (7/9/2025 11:51 AM)  Does the patient have all medications ordered at discharge?: Yes (7/9/2025 11:51 AM)  Care Management Interventions: No intervention needed (7/9/2025 11:51 AM)  Prescription Comments: Discharge Meds-     START taking these medications:     OxyCODONE 5 mg immediate release tablet; Commonly known as: Roxicodone; Take 1 tablet (5 mg) by mouth every 6 hours if needed for severe pain (7 - 10) for up to 5 days.   Tamsulosin 0.4 mg 24 hr capsule; Commonly known as: Flomax; Take 1   capsule (0.4 mg) by mouth once daily for 7 days. Do not crush, chew, or   split. (7/9/2025 11:51 AM)  Is the patient taking all medications as directed (includes completed medication regime)?: Yes (7/9/2025 11:51 AM)  Care Management Interventions: Provided patient education (7/9/2025 11:51 AM)  Medication Comments: Medications received from [facility]/  pharmacy #[id number] - [place], [place] -  [address] [facility] (7/9/2025 11:51 AM)    Appointments  Does the patient have a primary care provider?: Yes (7/9/2025 11:51 AM)  Care Management Interventions: Educated patient on importance of making appointment; Advised patient to make appointment (7/9/2025 11:51 AM)  Has the patient kept scheduled appointments due by today?: Not applicable (7/9/2025 11:51 AM)  Care Management Interventions: Advised to reschedule appointment; Advised to schedule with specialist (7/9/2025 11:51 AM)    Self Management  What is the home health agency?: n/a (7/9/2025 11:51 AM)  Has home health visited the patient within 72 hours of discharge?: Not applicable (7/9/2025 11:51 AM)  What Durable Medical Equipment (DME) was ordered?: n/a (7/9/2025 11:51 AM)    Patient Teaching  Does the patient have access to their discharge instructions?: Yes (7/9/2025 11:51 AM)  Care  Management Interventions: Reviewed instructions with patient (7/9/2025 11:51 AM)  What is the patient's perception of their health status since discharge?: Improving (7/9/2025 11:51 AM)  Is the patient/caregiver able to teach back the hierarchy of who to call/visit for symptoms/problems? PCP, Specialist, Home Health nurse, Urgent Care, ED, 911: Yes (7/9/2025 11:51 AM)

## 2025-07-10 LAB — BACTERIA UR CULT: ABNORMAL

## 2025-07-13 LAB
ATRIAL RATE: 88 BPM
P AXIS: 58 DEGREES
P OFFSET: 194 MS
P ONSET: 135 MS
PR INTERVAL: 172 MS
Q ONSET: 221 MS
QRS COUNT: 14 BEATS
QRS DURATION: 76 MS
QT INTERVAL: 350 MS
QTC CALCULATION(BAZETT): 423 MS
QTC FREDERICIA: 397 MS
R AXIS: 4 DEGREES
T AXIS: 34 DEGREES
T OFFSET: 396 MS
VENTRICULAR RATE: 88 BPM

## 2025-07-16 DIAGNOSIS — E11.9 CONTROLLED TYPE 2 DIABETES MELLITUS WITHOUT COMPLICATION, WITHOUT LONG-TERM CURRENT USE OF INSULIN: Primary | ICD-10-CM

## 2025-07-18 DIAGNOSIS — N28.89 LEFT KIDNEY MASS: Primary | ICD-10-CM

## 2025-07-22 ENCOUNTER — TELEPHONE (OUTPATIENT)
Dept: RADIOLOGY | Facility: HOSPITAL | Age: 65
End: 2025-07-22
Payer: COMMERCIAL

## 2025-07-22 NOTE — TELEPHONE ENCOUNTER
Patient: Helga Salmon  Date of Imagin2025  Finding: Indeterminate Renal Lesion  Radiologist Recommendation: Renal MRI  PCP: Dr. Grover Gustafson    Action Taken:   Helga has a scheduled follow-up appointment with Urology (Dr. Dmitry Fontaine) on 2025 following her recent hospital discharge for a kidney stone. The imaging report was routed to Dr. Fontaine on 2025 to determine whether the incidental kidney lesion noted could be addressed at that visit. The message was marked as read by the provider; however, no response or orders have been received to date.    A mobli message was sent to Helga on 2025 regarding the follow-up recommendation, and the imaging report was also routed to her primary care provider, Dr. Gustafson. Dr. Gustafson has since placed an order for an MRI to further evaluate the lesion. As of this note, the mobli message sent on 2025 remains unread.    Attempted to contact Helga by phone to notify her of the radiologist’s recommendation and the MRI order. The call was answered; I introduced myself and stated that I was calling from  Radiology, but the call was hung up.

## 2025-07-25 DIAGNOSIS — I10 BENIGN HYPERTENSION: ICD-10-CM

## 2025-07-25 DIAGNOSIS — I47.10 SUPRAVENTRICULAR TACHYCARDIA: ICD-10-CM

## 2025-07-25 DIAGNOSIS — E78.2 MIXED HYPERLIPIDEMIA: ICD-10-CM

## 2025-07-25 RX ORDER — ATORVASTATIN CALCIUM 40 MG/1
40 TABLET, FILM COATED ORAL DAILY
Qty: 90 TABLET | Refills: 3 | Status: SHIPPED | OUTPATIENT
Start: 2025-07-25

## 2025-07-25 RX ORDER — AMLODIPINE BESYLATE 10 MG/1
10 TABLET ORAL DAILY
Qty: 90 TABLET | Refills: 3 | Status: SHIPPED | OUTPATIENT
Start: 2025-07-25

## 2025-07-25 RX ORDER — METOPROLOL TARTRATE 50 MG/1
50 TABLET ORAL 2 TIMES DAILY
Qty: 180 TABLET | Refills: 3 | Status: SHIPPED | OUTPATIENT
Start: 2025-07-25

## 2025-08-03 NOTE — PROGRESS NOTES
Patient is a 64 y.o. female presenting for followup after ED visit for obstructing 2 mm right UVJ stone and left kidney lesion identified on CT.    SUBJECTIVE:  HPI   She states she feels well. She has had no further flank pain. She states this is her first kidney stone.     ED to Hospital admission : 07/08/2025  Patient presented with right-sided pain.  CT scan identified a 2 mm stone at the right ureterovesical junction and a 1.2 cm indeterminate low-density lesion in the mid left kidney.  She was having irritative voiding symptoms prior to presentation additionally.  Her creatinine is elevated at 1.13 from prior of 0.83 a year ago.  urinalysis identified greater than 50 WBCs and greater than 20 RBCs with 1+ bacteria. Urine culture identified >=100,000 CFU/mL Klebsiella pneumoniae/variicola (A), resistant to ampicillin.      Medical History[1]  Surgical History[2]     Review of Systems   Pertinent findings noted in the HPI.    OBJECTIVE:  There were no vitals taken for this visit.  Physical Exam   Constitutional: No obvious distress.  Cardiovascular: Extremities are warm and well perfused.  Respiratory: No audible wheezing/stridor; respirations do not appear labored.  Neurologic: Alert and oriented x3.  Genitourinary: No CVA tenderness, bladder not palpable.     LABS:  Lab Results   Component Value Date    WBC 6.6 07/08/2025    HGB 12.9 07/08/2025    HCT 40.4 07/08/2025    MCV 93 07/08/2025     (H) 07/08/2025    GLUCOSE 252 (H) 07/08/2025    CALCIUM 9.7 07/08/2025     07/08/2025    K 3.9 07/08/2025    CO2 25 07/08/2025     07/08/2025    BUN 24 (H) 07/08/2025    CREATININE 1.13 (H) 07/08/2025    EGFR 54 (L) 07/08/2025    URINECULTURE >=100,000 CFU/mL Klebsiella pneumoniae/variicola (A) 07/08/2025     IMAGING:  === 07/08/25 ===  CT ABDOMEN PELVIS W IV CONTRAST  - Impression -  2 mm nonobstructing calculus at the right ureterovesical junction  with mild right hydronephrosis.    Perinephric  fluid/stranding is nonspecific, however, please correlate  clinically to exclude superimposed infection.    1.2 cm indeterminate low-density lesion in the mid left kidney.  Please refer to follow-up recommendations below.    Stable hepatosplenomegaly and suggestion of hepatic steatosis.    MACRO:  Incidental Finding:  Renal lesion showing homogenous attenuation and  thin wall with no septation/calcification/mural nodules. Given the  internal higher than fluid attenuation, findings are indeterminate  for malignancy. (**-Y[facility]-**)    Instructions:  Further evaluation with outpatient MRI of kidneys  without and with contrast versus without and with contrast outpatient  CT scan is recommended. (Porfiiro BR, Yumiko SG, Curtis RAMÍREZ, et al.  Management of the Incidental Renal Mass on CT: A White Paper of the  ACR Incidental Findings Committee. J Am Rupert Radiol.  2018;15(2):264-273.) [email or web address]    PROCEDURES:  ASSESSMENT/PLAN:  Problem List Items Addressed This Visit       Right ureteral stone - Primary    Relevant Orders    Basic Metabolic Panel    Parathyroid Hormone, Intact    Uric Acid    Kidney lesion, native, left      She presented with right-sided flank pain.  CT identified a 2 mm obstructing right ureteral stone.  Films were viewed today. There is a 2 mm left kidney stone, too small to treat. Her right flank pain has resolved and she has had no further stone symptoms. She has likely passed the stone    For stone prevention, stone labs and a 24 hour urine ordered. She will followup in 1 month via Telemedicine.     Contrasted CTAP identified a 1.2 cm indeterminate low-density lesion in the mid left kidney. She will schedule CT kidneys and bladder with and without contrast.    Her stone was complicated by an elevated creatinine of 1.13 (7/8/2025).      She also appeared to have a urinary tract infection.  She was started on Rocephin.  Urine culture identified Klebsiella.     All questions were answered to  the patient’s satisfaction.  Patient agrees with the plan and wishes to proceed.  Follow-up will be scheduled appropriately.     IYasmeen, am scribing for, and in the presence of Dmitry Fontaine MD.     Dmitry POON MD, personally performed the services described in the documentation as scribed by Yasmeen Mejias, in my presence, and confirm it is both accurate and complete.         [1]   Past Medical History:  Diagnosis Date    Abnormal finding on thyroid function test 2012    B12 deficiency     BPPV (benign paroxysmal positional vertigo)     Controlled type 2 diabetes mellitus (Multi)     History of esophageal reflux     History of hyperlipidemia     History of hypertension     Lateral epicondylitis 2023    Morbid (severe) obesity due to excess calories (Multi) 2013    Morbid obesity    Nonalcoholic fatty liver     Right ureteral stone    [2]   Past Surgical History:  Procedure Laterality Date     SECTION, CLASSIC  2013     Section    LAPAROSCOPIC GASTRIC BANDING  2013    Laparosc Restrictive Proc Adjustable Gastric Band Placement    MOUTH SURGERY  2013    OTHER SURGICAL HISTORY  2013    Ovarian Cystectomy

## 2025-08-05 ENCOUNTER — APPOINTMENT (OUTPATIENT)
Dept: UROLOGY | Facility: CLINIC | Age: 65
End: 2025-08-05
Payer: MEDICARE

## 2025-08-05 DIAGNOSIS — N28.9 KIDNEY LESION, NATIVE, LEFT: ICD-10-CM

## 2025-08-05 DIAGNOSIS — N20.1 RIGHT URETERAL STONE: Primary | ICD-10-CM

## 2025-08-05 PROCEDURE — G2211 COMPLEX E/M VISIT ADD ON: HCPCS | Performed by: UROLOGY

## 2025-08-05 PROCEDURE — 99214 OFFICE O/P EST MOD 30 MIN: CPT | Performed by: UROLOGY

## 2025-08-05 ASSESSMENT — PAIN SCALES - GENERAL: PAINLEVEL_OUTOF10: 0-NO PAIN

## 2025-08-07 ENCOUNTER — TELEPHONE (OUTPATIENT)
Dept: UROLOGY | Facility: CLINIC | Age: 65
End: 2025-08-07
Payer: MEDICARE

## 2025-08-07 DIAGNOSIS — N28.9 KIDNEY LESION, NATIVE, LEFT: ICD-10-CM

## 2025-08-07 NOTE — TELEPHONE ENCOUNTER
Left voicemail to inform patient that KWS out in order for CT with Contrast, ready to schedule. Left patient our office phone number and business hours. Told patient to call with questions or concerns.

## 2025-08-13 ENCOUNTER — DOCUMENTATION (OUTPATIENT)
Dept: PRIMARY CARE | Facility: CLINIC | Age: 65
End: 2025-08-13
Payer: MEDICARE

## 2025-08-20 ENCOUNTER — APPOINTMENT (OUTPATIENT)
Dept: RADIOLOGY | Facility: HOSPITAL | Age: 65
End: 2025-08-20
Payer: MEDICARE

## 2025-08-20 DIAGNOSIS — N28.9 KIDNEY LESION, NATIVE, LEFT: ICD-10-CM

## 2025-08-20 LAB
CREAT SERPL-MCNC: 0.98 MG/DL (ref 0.6–1.3)
EGFRCR SERPLBLD CKD-EPI 2021: 64 ML/MIN/1.73M*2

## 2025-08-20 PROCEDURE — 82565 ASSAY OF CREATININE: CPT

## 2025-08-20 PROCEDURE — 74170 CT ABD WO CNTRST FLWD CNTRST: CPT

## 2025-08-20 PROCEDURE — 74170 CT ABD WO CNTRST FLWD CNTRST: CPT | Performed by: RADIOLOGY

## 2025-08-20 PROCEDURE — 2550000001 HC RX 255 CONTRASTS: Performed by: UROLOGY

## 2025-08-20 RX ADMIN — IOHEXOL 90 ML: 350 INJECTION, SOLUTION INTRAVENOUS at 14:40

## 2025-08-21 LAB
ANION GAP SERPL CALCULATED.4IONS-SCNC: 10 MMOL/L (CALC) (ref 7–17)
BUN SERPL-MCNC: 15 MG/DL (ref 7–25)
BUN/CREAT SERPL: ABNORMAL (CALC) (ref 6–22)
CALCIUM SERPL-MCNC: 9.2 MG/DL (ref 8.6–10.4)
CHLORIDE SERPL-SCNC: 103 MMOL/L (ref 98–110)
CO2 SERPL-SCNC: 25 MMOL/L (ref 20–32)
CREAT SERPL-MCNC: 0.87 MG/DL (ref 0.5–1.05)
EGFRCR SERPLBLD CKD-EPI 2021: 74 ML/MIN/1.73M2
GLUCOSE SERPL-MCNC: 182 MG/DL (ref 65–99)
POTASSIUM SERPL-SCNC: 4.2 MMOL/L (ref 3.5–5.3)
PTH-INTACT SERPL-MCNC: 49 PG/ML (ref 16–77)
SODIUM SERPL-SCNC: 138 MMOL/L (ref 135–146)
URATE SERPL-MCNC: 7.8 MG/DL (ref 2.5–7)

## 2025-09-02 ENCOUNTER — APPOINTMENT (OUTPATIENT)
Dept: UROLOGY | Facility: CLINIC | Age: 65
End: 2025-09-02
Payer: MEDICARE

## 2025-09-02 PROBLEM — N20.0 KIDNEY STONES: Status: ACTIVE | Noted: 2025-08-05
